# Patient Record
Sex: MALE | Race: BLACK OR AFRICAN AMERICAN | Employment: UNEMPLOYED | URBAN - METROPOLITAN AREA
[De-identification: names, ages, dates, MRNs, and addresses within clinical notes are randomized per-mention and may not be internally consistent; named-entity substitution may affect disease eponyms.]

---

## 2019-03-18 ENCOUNTER — APPOINTMENT (OUTPATIENT)
Dept: ULTRASOUND IMAGING | Age: 54
End: 2019-03-18
Payer: MEDICARE

## 2019-03-18 ENCOUNTER — APPOINTMENT (OUTPATIENT)
Dept: CT IMAGING | Age: 54
End: 2019-03-18
Payer: MEDICARE

## 2019-03-18 ENCOUNTER — HOSPITAL ENCOUNTER (EMERGENCY)
Age: 54
Discharge: HOME | End: 2019-03-18
Attending: EMERGENCY MEDICINE
Payer: MEDICARE

## 2019-03-18 VITALS
SYSTOLIC BLOOD PRESSURE: 215 MMHG | HEIGHT: 67 IN | WEIGHT: 200 LBS | OXYGEN SATURATION: 98 % | HEART RATE: 84 BPM | DIASTOLIC BLOOD PRESSURE: 142 MMHG | TEMPERATURE: 97.8 F | BODY MASS INDEX: 31.39 KG/M2 | RESPIRATION RATE: 18 BRPM

## 2019-03-18 DIAGNOSIS — H40.212 ACUTE ANGLE-CLOSURE GLAUCOMA OF LEFT EYE: Primary | ICD-10-CM

## 2019-03-18 DIAGNOSIS — I16.1 HYPERTENSIVE EMERGENCY: ICD-10-CM

## 2019-03-18 DIAGNOSIS — N17.9 AKI (ACUTE KIDNEY INJURY) (HCC): ICD-10-CM

## 2019-03-18 LAB
ALBUMIN SERPL-MCNC: 4.6 GM/DL (ref 3.4–5)
ALP BLD-CCNC: 108 IU/L (ref 40–129)
ALT SERPL-CCNC: 6 U/L (ref 10–40)
ANION GAP SERPL CALCULATED.3IONS-SCNC: 12 MMOL/L (ref 4–16)
AST SERPL-CCNC: 18 IU/L (ref 15–37)
BACTERIA: ABNORMAL /HPF
BASOPHILS ABSOLUTE: 0.1 K/CU MM
BASOPHILS RELATIVE PERCENT: 1 % (ref 0–1)
BILIRUB SERPL-MCNC: 1.2 MG/DL (ref 0–1)
BILIRUBIN URINE: NEGATIVE MG/DL
BLOOD, URINE: ABNORMAL
BUN BLDV-MCNC: 28 MG/DL (ref 6–23)
CALCIUM SERPL-MCNC: 9.3 MG/DL (ref 8.3–10.6)
CHLORIDE BLD-SCNC: 100 MMOL/L (ref 99–110)
CHLORIDE URINE RANDOM: 41 MMOL/L (ref 43–210)
CLARITY: CLEAR
CO2: 23 MMOL/L (ref 21–32)
COLOR: YELLOW
CREAT SERPL-MCNC: 3.3 MG/DL (ref 0.9–1.3)
CREATININE URINE: 222.4 MG/DL (ref 39–259)
DIFFERENTIAL TYPE: ABNORMAL
EOSINOPHILS ABSOLUTE: 0.1 K/CU MM
EOSINOPHILS RELATIVE PERCENT: 0.6 % (ref 0–3)
GFR AFRICAN AMERICAN: 24 ML/MIN/1.73M2
GFR NON-AFRICAN AMERICAN: 20 ML/MIN/1.73M2
GLUCOSE BLD-MCNC: 109 MG/DL (ref 70–99)
GLUCOSE, URINE: NEGATIVE MG/DL
HCT VFR BLD CALC: 51 % (ref 42–52)
HEMOGLOBIN: 16.8 GM/DL (ref 13.5–18)
HYALINE CASTS: 2 /LPF
IMMATURE NEUTROPHIL %: 0.3 % (ref 0–0.43)
KETONES, URINE: ABNORMAL MG/DL
LEUKOCYTE ESTERASE, URINE: NEGATIVE
LYMPHOCYTES ABSOLUTE: 2.3 K/CU MM
LYMPHOCYTES RELATIVE PERCENT: 22.9 % (ref 24–44)
MCH RBC QN AUTO: 31.2 PG (ref 27–31)
MCHC RBC AUTO-ENTMCNC: 32.9 % (ref 32–36)
MCV RBC AUTO: 94.8 FL (ref 78–100)
MONOCYTES ABSOLUTE: 0.8 K/CU MM
MONOCYTES RELATIVE PERCENT: 8 % (ref 0–4)
MUCUS: ABNORMAL HPF
NITRITE URINE, QUANTITATIVE: NEGATIVE
NUCLEATED RBC %: 0 %
PDW BLD-RTO: 12.5 % (ref 11.7–14.9)
PH, URINE: 5 (ref 5–8)
PLATELET # BLD: ABNORMAL K/CU MM (ref 140–440)
POTASSIUM SERPL-SCNC: 4.1 MMOL/L (ref 3.5–5.1)
POTASSIUM, UR: 33.4 MMOL/L (ref 22–119)
PROT/CREAT RATIO, UR: ABNORMAL
PROTEIN UA: >500 MG/DL
RBC # BLD: 5.38 M/CU MM (ref 4.6–6.2)
RBC URINE: 36 /HPF (ref 0–3)
SEGMENTED NEUTROPHILS ABSOLUTE COUNT: 6.7 K/CU MM
SEGMENTED NEUTROPHILS RELATIVE PERCENT: 67.2 % (ref 36–66)
SODIUM BLD-SCNC: 135 MMOL/L (ref 135–145)
SODIUM URINE: 45 MMOL/L (ref 35–167)
SPECIFIC GRAVITY UA: 1.02 (ref 1–1.03)
SQUAMOUS EPITHELIAL: 10 /HPF
TOTAL IMMATURE NEUTOROPHIL: 0.03 K/CU MM
TOTAL NUCLEATED RBC: 0 K/CU MM
TOTAL PROTEIN: 8.1 GM/DL (ref 6.4–8.2)
TRICHOMONAS: ABNORMAL /HPF
URINE TOTAL PROTEIN: 409.7 MG/DL
UROBILINOGEN, URINE: 2 MG/DL (ref 0.2–1)
WBC # BLD: 10 K/CU MM (ref 4–10.5)
WBC UA: 8 /HPF (ref 0–2)
YEAST: ABNORMAL /HPF

## 2019-03-18 PROCEDURE — 6370000000 HC RX 637 (ALT 250 FOR IP): Performed by: PHYSICIAN ASSISTANT

## 2019-03-18 PROCEDURE — 82436 ASSAY OF URINE CHLORIDE: CPT

## 2019-03-18 PROCEDURE — 96374 THER/PROPH/DIAG INJ IV PUSH: CPT

## 2019-03-18 PROCEDURE — 2500000003 HC RX 250 WO HCPCS: Performed by: PHYSICIAN ASSISTANT

## 2019-03-18 PROCEDURE — 99285 EMERGENCY DEPT VISIT HI MDM: CPT

## 2019-03-18 PROCEDURE — 80053 COMPREHEN METABOLIC PANEL: CPT

## 2019-03-18 PROCEDURE — 84133 ASSAY OF URINE POTASSIUM: CPT

## 2019-03-18 PROCEDURE — 70450 CT HEAD/BRAIN W/O DYE: CPT

## 2019-03-18 PROCEDURE — 76775 US EXAM ABDO BACK WALL LIM: CPT

## 2019-03-18 PROCEDURE — 81001 URINALYSIS AUTO W/SCOPE: CPT

## 2019-03-18 PROCEDURE — 36415 COLL VENOUS BLD VENIPUNCTURE: CPT

## 2019-03-18 PROCEDURE — 84300 ASSAY OF URINE SODIUM: CPT

## 2019-03-18 PROCEDURE — 93010 ELECTROCARDIOGRAM REPORT: CPT | Performed by: INTERNAL MEDICINE

## 2019-03-18 PROCEDURE — 93005 ELECTROCARDIOGRAM TRACING: CPT | Performed by: PHYSICIAN ASSISTANT

## 2019-03-18 PROCEDURE — 84156 ASSAY OF PROTEIN URINE: CPT

## 2019-03-18 PROCEDURE — 85025 COMPLETE CBC W/AUTO DIFF WBC: CPT

## 2019-03-18 PROCEDURE — 82570 ASSAY OF URINE CREATININE: CPT

## 2019-03-18 RX ORDER — LATANOPROST 50 UG/ML
1 SOLUTION/ DROPS OPHTHALMIC ONCE
Status: COMPLETED | OUTPATIENT
Start: 2019-03-18 | End: 2019-03-18

## 2019-03-18 RX ORDER — TETRACAINE HYDROCHLORIDE 5 MG/ML
1 SOLUTION OPHTHALMIC ONCE
Status: COMPLETED | OUTPATIENT
Start: 2019-03-18 | End: 2019-03-18

## 2019-03-18 RX ORDER — HYDROCHLOROTHIAZIDE 25 MG/1
25 TABLET ORAL DAILY
Qty: 30 TABLET | Refills: 0 | Status: ON HOLD | OUTPATIENT
Start: 2019-03-18 | End: 2019-03-27 | Stop reason: HOSPADM

## 2019-03-18 RX ORDER — ONDANSETRON 4 MG/1
4 TABLET, ORALLY DISINTEGRATING ORAL ONCE
Status: COMPLETED | OUTPATIENT
Start: 2019-03-18 | End: 2019-03-18

## 2019-03-18 RX ORDER — TIMOLOL MALEATE 5 MG/ML
1 SOLUTION/ DROPS OPHTHALMIC ONCE
Status: COMPLETED | OUTPATIENT
Start: 2019-03-18 | End: 2019-03-18

## 2019-03-18 RX ORDER — LABETALOL HYDROCHLORIDE 5 MG/ML
10 INJECTION, SOLUTION INTRAVENOUS ONCE
Status: COMPLETED | OUTPATIENT
Start: 2019-03-18 | End: 2019-03-18

## 2019-03-18 RX ORDER — HYDROCODONE BITARTRATE AND ACETAMINOPHEN 5; 325 MG/1; MG/1
1 TABLET ORAL ONCE
Status: COMPLETED | OUTPATIENT
Start: 2019-03-18 | End: 2019-03-18

## 2019-03-18 RX ADMIN — ONDANSETRON 4 MG: 4 TABLET, ORALLY DISINTEGRATING ORAL at 11:42

## 2019-03-18 RX ADMIN — LABETALOL HYDROCHLORIDE 10 MG: 5 INJECTION, SOLUTION INTRAVENOUS at 12:55

## 2019-03-18 RX ADMIN — FLUORESCEIN SODIUM 1 MG: 1 STRIP OPHTHALMIC at 12:56

## 2019-03-18 RX ADMIN — HYDROCODONE BITARTRATE AND ACETAMINOPHEN 1 TABLET: 5; 325 TABLET ORAL at 11:58

## 2019-03-18 RX ADMIN — TIMOLOL MALEATE 1 DROP: 5 SOLUTION OPHTHALMIC at 13:58

## 2019-03-18 RX ADMIN — TETRACAINE HYDROCHLORIDE 1 DROP: 25 LIQUID OPHTHALMIC at 12:56

## 2019-03-18 RX ADMIN — LATANOPROST 1 DROP: 50 SOLUTION OPHTHALMIC at 13:58

## 2019-03-18 SDOH — HEALTH STABILITY: MENTAL HEALTH: HOW OFTEN DO YOU HAVE A DRINK CONTAINING ALCOHOL?: NEVER

## 2019-03-18 ASSESSMENT — PAIN DESCRIPTION - PAIN TYPE: TYPE: ACUTE PAIN

## 2019-03-18 ASSESSMENT — PAIN DESCRIPTION - ORIENTATION: ORIENTATION: LEFT

## 2019-03-18 ASSESSMENT — PAIN SCALES - GENERAL
PAINLEVEL_OUTOF10: 10
PAINLEVEL_OUTOF10: 10

## 2019-03-18 ASSESSMENT — PAIN DESCRIPTION - LOCATION: LOCATION: EYE

## 2019-03-22 LAB
EKG ATRIAL RATE: 88 BPM
EKG DIAGNOSIS: NORMAL
EKG P AXIS: 61 DEGREES
EKG P-R INTERVAL: 158 MS
EKG Q-T INTERVAL: 396 MS
EKG QRS DURATION: 82 MS
EKG QTC CALCULATION (BAZETT): 479 MS
EKG R AXIS: 55 DEGREES
EKG T AXIS: 45 DEGREES
EKG VENTRICULAR RATE: 88 BPM

## 2019-03-23 ENCOUNTER — HOSPITAL ENCOUNTER (INPATIENT)
Age: 54
LOS: 4 days | Discharge: HOME | DRG: 194 | End: 2019-03-27
Attending: EMERGENCY MEDICINE | Admitting: HOSPITALIST
Payer: MEDICARE

## 2019-03-23 DIAGNOSIS — I10 MALIGNANT HYPERTENSION: Primary | ICD-10-CM

## 2019-03-23 PROBLEM — I16.1 HYPERTENSIVE EMERGENCY: Status: ACTIVE | Noted: 2019-03-23

## 2019-03-23 LAB
ALBUMIN SERPL-MCNC: 4.6 GM/DL (ref 3.4–5)
ALP BLD-CCNC: 106 IU/L (ref 40–129)
ALT SERPL-CCNC: 13 U/L (ref 10–40)
AMPHETAMINES: NEGATIVE
ANION GAP SERPL CALCULATED.3IONS-SCNC: 17 MMOL/L (ref 4–16)
AST SERPL-CCNC: 18 IU/L (ref 15–37)
BACTERIA: ABNORMAL /HPF
BARBITURATE SCREEN URINE: NEGATIVE
BASOPHILS ABSOLUTE: 0.1 K/CU MM
BASOPHILS RELATIVE PERCENT: 1 % (ref 0–1)
BENZODIAZEPINE SCREEN, URINE: NEGATIVE
BILIRUB SERPL-MCNC: 1.5 MG/DL (ref 0–1)
BILIRUBIN URINE: NEGATIVE MG/DL
BLOOD, URINE: ABNORMAL
BUN BLDV-MCNC: 45 MG/DL (ref 6–23)
CALCIUM SERPL-MCNC: 9.4 MG/DL (ref 8.3–10.6)
CANNABINOID SCREEN URINE: NEGATIVE
CHLORIDE BLD-SCNC: 91 MMOL/L (ref 99–110)
CHLORIDE URINE RANDOM: 29 MMOL/L (ref 43–210)
CLARITY: CLEAR
CO2: 22 MMOL/L (ref 21–32)
COCAINE METABOLITE: NEGATIVE
COLOR: YELLOW
COMMENT: ABNORMAL
CREAT SERPL-MCNC: 4.3 MG/DL (ref 0.9–1.3)
CREATININE URINE: 196.3 MG/DL
CREATININE URINE: 201.2 MG/DL (ref 39–259)
DIFFERENTIAL TYPE: ABNORMAL
DIFFERENTIAL TYPE: ABNORMAL
EOSINOPHILS ABSOLUTE: 0.1 K/CU MM
EOSINOPHILS RELATIVE PERCENT: 0.6 % (ref 0–3)
GFR AFRICAN AMERICAN: 18 ML/MIN/1.73M2
GFR NON-AFRICAN AMERICAN: 15 ML/MIN/1.73M2
GIANT PLATELETS: PRESENT
GLUCOSE BLD-MCNC: 108 MG/DL (ref 70–99)
GLUCOSE, URINE: NEGATIVE MG/DL
HCT VFR BLD CALC: 54.3 % (ref 42–52)
HEMOGLOBIN: 18.2 GM/DL (ref 13.5–18)
HYALINE CASTS: 2 /LPF
IMMATURE NEUTROPHIL %: 0.3 % (ref 0–0.43)
KETONES, URINE: NEGATIVE MG/DL
LACTATE DEHYDROGENASE: 337 IU/L (ref 120–246)
LEUKOCYTE ESTERASE, URINE: NEGATIVE
LYMPHOCYTES ABSOLUTE: 3.4 K/CU MM
LYMPHOCYTES RELATIVE PERCENT: 29.3 % (ref 24–44)
MCH RBC QN AUTO: 31.2 PG (ref 27–31)
MCHC RBC AUTO-ENTMCNC: 33.5 % (ref 32–36)
MCV RBC AUTO: 93.1 FL (ref 78–100)
MONOCYTES ABSOLUTE: 1 K/CU MM
MONOCYTES RELATIVE PERCENT: 8.8 % (ref 0–4)
MUCUS: ABNORMAL HPF
NITRITE URINE, QUANTITATIVE: NEGATIVE
NUCLEATED RBC %: 0 %
OPIATES, URINE: NEGATIVE
OSMOLALITY URINE: 411 MOS/L (ref 292–1090)
OXYCODONE: NORMAL
PDW BLD-RTO: 11.9 % (ref 11.7–14.9)
PH, URINE: 5 (ref 5–8)
PHENCYCLIDINE, URINE: NEGATIVE
PLATELET # BLD: ABNORMAL K/CU MM (ref 140–440)
POTASSIUM SERPL-SCNC: 3.2 MMOL/L (ref 3.5–5.1)
POTASSIUM, UR: 44.3 MMOL/L (ref 22–119)
PRO-BNP: 6059 PG/ML
PROT/CREAT RATIO, UR: ABNORMAL
PROTEIN UA: 100 MG/DL
RBC # BLD: 5.83 M/CU MM (ref 4.6–6.2)
RBC # BLD: ABNORMAL 10*6/UL
RBC URINE: 41 /HPF (ref 0–3)
RETICULOCYTE COUNT PCT: 1.6 % (ref 0.2–2.2)
SEGMENTED NEUTROPHILS ABSOLUTE COUNT: 7 K/CU MM
SEGMENTED NEUTROPHILS RELATIVE PERCENT: 60 % (ref 36–66)
SODIUM BLD-SCNC: 130 MMOL/L (ref 135–145)
SODIUM URINE: 18 MMOL/L (ref 35–167)
SPECIFIC GRAVITY UA: 1.01 (ref 1–1.03)
TOTAL IMMATURE NEUTOROPHIL: 0.03 K/CU MM
TOTAL NUCLEATED RBC: 0 K/CU MM
TOTAL PROTEIN: 8.3 GM/DL (ref 6.4–8.2)
TRICHOMONAS: ABNORMAL /HPF
TROPONIN T: 0.05 NG/ML
TROPONIN T: 0.12 NG/ML
URINE TOTAL PROTEIN: 272.8 MG/DL
UROBILINOGEN, URINE: 1 MG/DL (ref 0.2–1)
WBC # BLD: 11.6 K/CU MM (ref 4–10.5)
WBC UA: <1 /HPF (ref 0–2)

## 2019-03-23 PROCEDURE — 96365 THER/PROPH/DIAG IV INF INIT: CPT

## 2019-03-23 PROCEDURE — 84244 ASSAY OF RENIN: CPT

## 2019-03-23 PROCEDURE — 84156 ASSAY OF PROTEIN URINE: CPT

## 2019-03-23 PROCEDURE — 85025 COMPLETE CBC W/AUTO DIFF WBC: CPT

## 2019-03-23 PROCEDURE — 82436 ASSAY OF URINE CHLORIDE: CPT

## 2019-03-23 PROCEDURE — 85045 AUTOMATED RETICULOCYTE COUNT: CPT

## 2019-03-23 PROCEDURE — 99253 IP/OBS CNSLTJ NEW/EST LOW 45: CPT | Performed by: INTERNAL MEDICINE

## 2019-03-23 PROCEDURE — 2580000003 HC RX 258: Performed by: EMERGENCY MEDICINE

## 2019-03-23 PROCEDURE — 99285 EMERGENCY DEPT VISIT HI MDM: CPT

## 2019-03-23 PROCEDURE — 84133 ASSAY OF URINE POTASSIUM: CPT

## 2019-03-23 PROCEDURE — 83010 ASSAY OF HAPTOGLOBIN QUANT: CPT

## 2019-03-23 PROCEDURE — 83935 ASSAY OF URINE OSMOLALITY: CPT

## 2019-03-23 PROCEDURE — 83835 ASSAY OF METANEPHRINES: CPT

## 2019-03-23 PROCEDURE — 6360000002 HC RX W HCPCS: Performed by: INTERNAL MEDICINE

## 2019-03-23 PROCEDURE — 83880 ASSAY OF NATRIURETIC PEPTIDE: CPT

## 2019-03-23 PROCEDURE — 2580000003 HC RX 258: Performed by: NURSE PRACTITIONER

## 2019-03-23 PROCEDURE — 2060000000 HC ICU INTERMEDIATE R&B

## 2019-03-23 PROCEDURE — 83615 LACTATE (LD) (LDH) ENZYME: CPT

## 2019-03-23 PROCEDURE — 80053 COMPREHEN METABOLIC PANEL: CPT

## 2019-03-23 PROCEDURE — 80307 DRUG TEST PRSMV CHEM ANLYZR: CPT

## 2019-03-23 PROCEDURE — 2500000003 HC RX 250 WO HCPCS: Performed by: EMERGENCY MEDICINE

## 2019-03-23 PROCEDURE — 6370000000 HC RX 637 (ALT 250 FOR IP): Performed by: INTERNAL MEDICINE

## 2019-03-23 PROCEDURE — 6370000000 HC RX 637 (ALT 250 FOR IP): Performed by: NURSE PRACTITIONER

## 2019-03-23 PROCEDURE — 82088 ASSAY OF ALDOSTERONE: CPT

## 2019-03-23 PROCEDURE — 84484 ASSAY OF TROPONIN QUANT: CPT

## 2019-03-23 PROCEDURE — 36415 COLL VENOUS BLD VENIPUNCTURE: CPT

## 2019-03-23 PROCEDURE — 81001 URINALYSIS AUTO W/SCOPE: CPT

## 2019-03-23 PROCEDURE — 84300 ASSAY OF URINE SODIUM: CPT

## 2019-03-23 PROCEDURE — 82570 ASSAY OF URINE CREATININE: CPT

## 2019-03-23 RX ORDER — POTASSIUM CHLORIDE 7.45 MG/ML
10 INJECTION INTRAVENOUS PRN
Status: DISCONTINUED | OUTPATIENT
Start: 2019-03-23 | End: 2019-03-27 | Stop reason: HOSPADM

## 2019-03-23 RX ORDER — SODIUM CHLORIDE 0.9 % (FLUSH) 0.9 %
10 SYRINGE (ML) INJECTION PRN
Status: DISCONTINUED | OUTPATIENT
Start: 2019-03-23 | End: 2019-03-27 | Stop reason: HOSPADM

## 2019-03-23 RX ORDER — HEPARIN SODIUM 5000 [USP'U]/ML
5000 INJECTION, SOLUTION INTRAVENOUS; SUBCUTANEOUS EVERY 8 HOURS SCHEDULED
Status: DISCONTINUED | OUTPATIENT
Start: 2019-03-23 | End: 2019-03-23

## 2019-03-23 RX ORDER — SODIUM CHLORIDE 9 MG/ML
INJECTION, SOLUTION INTRAVENOUS CONTINUOUS
Status: DISCONTINUED | OUTPATIENT
Start: 2019-03-23 | End: 2019-03-23

## 2019-03-23 RX ORDER — HYDROCHLOROTHIAZIDE 12.5 MG/1
25 TABLET ORAL DAILY
Status: DISCONTINUED | OUTPATIENT
Start: 2019-03-23 | End: 2019-03-25

## 2019-03-23 RX ORDER — SODIUM CHLORIDE 0.9 % (FLUSH) 0.9 %
10 SYRINGE (ML) INJECTION EVERY 12 HOURS SCHEDULED
Status: DISCONTINUED | OUTPATIENT
Start: 2019-03-23 | End: 2019-03-27 | Stop reason: HOSPADM

## 2019-03-23 RX ORDER — ACETAMINOPHEN 325 MG/1
650 TABLET ORAL EVERY 4 HOURS PRN
Status: DISCONTINUED | OUTPATIENT
Start: 2019-03-23 | End: 2019-03-27 | Stop reason: HOSPADM

## 2019-03-23 RX ORDER — HEPARIN SODIUM 5000 [USP'U]/ML
5000 INJECTION, SOLUTION INTRAVENOUS; SUBCUTANEOUS 2 TIMES DAILY
Status: DISCONTINUED | OUTPATIENT
Start: 2019-03-23 | End: 2019-03-27 | Stop reason: HOSPADM

## 2019-03-23 RX ORDER — POTASSIUM CHLORIDE 20 MEQ/1
40 TABLET, EXTENDED RELEASE ORAL PRN
Status: DISCONTINUED | OUTPATIENT
Start: 2019-03-23 | End: 2019-03-27 | Stop reason: HOSPADM

## 2019-03-23 RX ORDER — ONDANSETRON 2 MG/ML
4 INJECTION INTRAMUSCULAR; INTRAVENOUS EVERY 6 HOURS PRN
Status: DISCONTINUED | OUTPATIENT
Start: 2019-03-23 | End: 2019-03-27 | Stop reason: HOSPADM

## 2019-03-23 RX ORDER — MAGNESIUM SULFATE 1 G/100ML
1 INJECTION INTRAVENOUS PRN
Status: DISCONTINUED | OUTPATIENT
Start: 2019-03-23 | End: 2019-03-27 | Stop reason: HOSPADM

## 2019-03-23 RX ORDER — METOPROLOL TARTRATE 50 MG/1
50 TABLET, FILM COATED ORAL 2 TIMES DAILY
Status: DISCONTINUED | OUTPATIENT
Start: 2019-03-23 | End: 2019-03-25

## 2019-03-23 RX ORDER — POTASSIUM CHLORIDE 1.5 G/1.77G
40 POWDER, FOR SOLUTION ORAL PRN
Status: DISCONTINUED | OUTPATIENT
Start: 2019-03-23 | End: 2019-03-27 | Stop reason: HOSPADM

## 2019-03-23 RX ORDER — DOCUSATE SODIUM 100 MG/1
100 CAPSULE, LIQUID FILLED ORAL 2 TIMES DAILY
Status: DISCONTINUED | OUTPATIENT
Start: 2019-03-23 | End: 2019-03-27 | Stop reason: HOSPADM

## 2019-03-23 RX ADMIN — ACETAMINOPHEN 650 MG: 325 TABLET ORAL at 17:18

## 2019-03-23 RX ADMIN — HYDROCHLOROTHIAZIDE 25 MG: 12.5 TABLET ORAL at 17:18

## 2019-03-23 RX ADMIN — DOCUSATE SODIUM 100 MG: 100 CAPSULE, LIQUID FILLED ORAL at 21:20

## 2019-03-23 RX ADMIN — SODIUM CHLORIDE, PRESERVATIVE FREE 10 ML: 5 INJECTION INTRAVENOUS at 21:23

## 2019-03-23 RX ADMIN — DEXTROSE MONOHYDRATE 3 MG/HR: 50 INJECTION, SOLUTION INTRAVENOUS at 13:41

## 2019-03-23 RX ADMIN — METOPROLOL TARTRATE 50 MG: 50 TABLET ORAL at 21:20

## 2019-03-23 RX ADMIN — HEPARIN SODIUM 5000 UNITS: 5000 INJECTION, SOLUTION INTRAVENOUS; SUBCUTANEOUS at 21:20

## 2019-03-23 ASSESSMENT — PAIN SCALES - GENERAL
PAINLEVEL_OUTOF10: 0
PAINLEVEL_OUTOF10: 3
PAINLEVEL_OUTOF10: 2
PAINLEVEL_OUTOF10: 10
PAINLEVEL_OUTOF10: 10

## 2019-03-23 ASSESSMENT — PAIN DESCRIPTION - PAIN TYPE
TYPE: OTHER (COMMENT)
TYPE: ACUTE PAIN
TYPE: ACUTE PAIN

## 2019-03-23 ASSESSMENT — PAIN DESCRIPTION - DESCRIPTORS
DESCRIPTORS: ACHING;HEADACHE
DESCRIPTORS: ACHING;HEADACHE

## 2019-03-23 ASSESSMENT — PAIN DESCRIPTION - LOCATION
LOCATION: HEAD

## 2019-03-23 NOTE — CONSULTS
Inpatient consult to Nephrology  Consult performed by: Kimberli Mtz MD  Consult ordered by: Natty Mckay MD  Assessment/Recommendations: Pt seen ,examined,interviewed and chart reviewed. Please see the dictated consult for details     Imp :   1,. HTn urgency with his hb > 16 g/dl [facility] but still possible as his PLT about 140 K - his K is low suggestive of high renin/ moni- need w/u for sec HTN including serology for SS / renal artery stenosis etc   2. KOFFI vs CKD- hsi EKG suggest hypertensive changes .  He also admits long standing HTN not on any tx - which would make sense     Plan:  Agree with plain ua and urine chemistry  - I have added LDH/ / hapto / with existing lab - just in    kimberly try see the peripheral smear for schoctocyteds  Goal is to lower BP 25 % within next 6 h and than slowly lowrrt  replet K and add moni and renin and metanephrin  Add SS szerology in am just in case   After moni drawn may add MRA  I am hesitant to try tropicamide - drop as she may have acute angle glaucoma will get in touch with the ophthalmologist  and he may need  Another good exam       Thanks for the consult    #[id number]

## 2019-03-23 NOTE — ED PROVIDER NOTES
file    Years of education: Not on file    Highest education level: Not on file   Occupational History    Not on file   Social Needs    Financial resource strain: Not on file    Food insecurity:     Worry: Not on file     Inability: Not on file    Transportation needs:     Medical: Not on file     Non-medical: Not on file   Tobacco Use    Smoking status: Current Every Day Smoker     Packs/day: 0.25     Types: Cigarettes    Smokeless tobacco: Never Used   Substance and Sexual Activity    Alcohol use: Never     Frequency: Never    Drug use: Never    Sexual activity: Not on file   Lifestyle    Physical activity:     Days per week: Not on file     Minutes per session: Not on file    Stress: Not on file   Relationships    Social connections:     Talks on phone: Not on file     Gets together: Not on file     Attends [employer] service: Not on file     Active member of club or organization: Not on file     Attends meetings of clubs or organizations: Not on file     Relationship status: Not on file    Intimate partner violence:     Fear of current or ex partner: Not on file     Emotionally abused: Not on file     Physically abused: Not on file     Forced sexual activity: Not on file   Other Topics Concern    Not on file   Social History Narrative    Not on file     Current Facility-Administered Medications   Medication Dose Route Frequency Provider Last Rate Last Dose    niCARdipine (CARDENE) 50 mg in dextrose 5 % 250 mL infusion  3 mg/hr Intravenous Continuous Armando Reyna MD         Current Outpatient Medications   Medication Sig Dispense Refill    hydrochlorothiazide (HYDRODIURIL) 25 MG tablet Take 1 tablet by mouth daily 30 tablet 0     Allergies   Allergen Reactions    Pcn [Penicillins]      diarrhea       Nursing Notes Reviewed    Physical Exam:  ED Triage Vitals [03/23/19 1209]   Enc Vitals Group      BP (!) 227/151      Pulse 109      Resp 18      Temp 98.9 °F (37.2 °C)      Temp Source Oral SpO2 97 %      Weight 200 lb (90.7 kg)      Height 5' 7\" (1.702 m)      Head Circumference       Peak Flow       Pain Score       Pain Loc       Pain Edu? Excl. in [address]? My pulse ox interpretation is - normal    General appearance:  No acute distress. Skin:  Warm. Dry. Eye:  Extraocular movements intact. Conjunctival injection noted in the left eye     Ears, nose, mouth and throat:  Oral mucosa moist   Neck:  Trachea midline. Extremity:  No swelling. Normal ROM     Heart:  Regular  Perfusion:  intact  Respiratory:   Respirations nonlabored. Abdominal:  Normal bowel sounds. Soft. Nontender. Non distended. Neurological:  Alert and oriented times 3.  Moves all extremities equally, cranial nerves grossly intact    I have reviewed and interpreted all of the currently available lab results from this visit (if applicable):  Results for orders placed or performed during the hospital encounter of 03/23/19   Comprehensive Metabolic Panel   Result Value Ref Range    Sodium 130 (L) 135 - 145 MMOL/L    Potassium 3.2 (L) 3.5 - 5.1 MMOL/L    Chloride 91 (L) 99 - 110 mMol/L    CO2 22 21 - 32 MMOL/L    BUN 45 (H) 6 - 23 MG/DL    CREATININE 4.3 (H) 0.9 - 1.3 MG/DL    Glucose 108 (H) 70 - 99 MG/DL    Calcium 9.4 8.3 - 10.6 MG/DL    Alb 4.6 3.4 - 5.0 GM/DL    Total Protein 8.3 (H) 6.4 - 8.2 GM/DL    Total Bilirubin 1.5 (H) 0.0 - 1.0 MG/DL    ALT 13 10 - 40 U/L    AST 18 15 - 37 IU/L    Alkaline Phosphatase 106 40 - 129 IU/L    GFR Non-African American 15 (L) >60 mL/min/1.73m2    GFR  18 (L) >60 mL/min/1.73m2    Anion Gap 17 (H) 4 - 16   CBC Auto Differential   Result Value Ref Range    WBC 11.6 (H) 4.0 - 10.5 K/CU MM    RBC 5.83 4.6 - 6.2 M/CU MM    Hemoglobin 18.2 (H) 13.5 - 18.0 GM/DL    Hematocrit 54.3 (H) 42 - 52 %    MCV 93.1 78 - 100 FL    MCH 31.2 (H) 27 - 31 PG    MCHC 33.5 32.0 - 36.0 %    RDW 11.9 11.7 - 14.9 %    Differential Type AUTOMATED DIFFERENTIAL     Segs Relative 60.0 36 - 66 %    Lymphocytes % 29.3 24 - 44 %    Monocytes % 8.8 (H) 0 - 4 %    Eosinophils % 0.6 0 - 3 %    Basophils % 1.0 0 - 1 %    Segs Absolute 7.0 K/CU MM    Lymphocytes # 3.4 K/CU MM    Monocytes # 1.0 K/CU MM    Eosinophils # 0.1 K/CU MM    Basophils # 0.1 K/CU MM    Nucleated RBC % 0.0 %    Total Nucleated RBC 0.0 K/CU MM    Total Immature Neutrophil 0.03 K/CU MM    Immature Neutrophil % 0.3 0 - 0.43 %      Radiographs (if obtained):  [] The following radiograph was interpreted by myself in the absence of a radiologist:   [] Radiologist's Report Reviewed:  No orders to display         EKG (if obtained): (All EKG's are interpreted by myself in the absence of a cardiologist)    Chart review shows recent radiographs:  Ct Head Wo Contrast    Result Date: 3/18/2019  EXAMINATION: CT OF THE HEAD WITHOUT CONTRAST  3/18/2019 1:27 pm TECHNIQUE: CT of the head was performed without the administration of intravenous contrast. Dose modulation, iterative reconstruction, and/or weight based adjustment of the mA/kV was utilized to reduce the radiation dose to as low as reasonably achievable. COMPARISON: None. HISTORY: ORDERING SYSTEM PROVIDED HISTORY: headache TECHNOLOGIST PROVIDED HISTORY: Has a \"code stroke\" or \"stroke alert\" been called? ->No Ordering Physician Provided Reason for Exam: headache; HTN X2 WEEKS Acuity: Acute Type of Exam: Initial Relevant Medical/Surgical History: NONE FINDINGS: BRAIN/VENTRICLES: There is no acute intracranial hemorrhage, mass effect or midline shift. No abnormal extra-axial fluid collection. The gray-white differentiation is maintained without evidence of an acute infarct. There is no evidence of hydrocephalus. There are nonspecific hypoattenuating foci in the subcortical and periventricular white matter that most likely represent chronic microangiopathic ischemic changes in a patient of this age. ORBITS: The visualized portion of the orbits demonstrate no acute abnormality.  SINUSES: The visualized paranasal sinuses and mastoid air cells demonstrate no acute abnormality. SOFT TISSUES/SKULL:  No acute abnormality of the visualized skull or soft tissues. No acute intracranial abnormality. Prominent subcortical and periventricular hypoattenuating white matter foci. No acute large vascular territorial infarct. Us Retroperitoneal Limited    Result Date: 3/18/2019  EXAMINATION: RETROPERITONEAL ULTRASOUND OF THE KIDNEYS 3/18/2019 COMPARISON: None HISTORY: ORDERING SYSTEM PROVIDED HISTORY: KOFFI TECHNOLOGIST PROVIDED HISTORY: Acuity: Acute Type of Exam: Initial Relevant Medical/Surgical History: uncontrolled htn FINDINGS: Kidneys: The right kidney measures 10.9 cm in length and the left kidney measures 10 cm in length. Renal cortex measures 17-18 mm in thickness. Kidneys demonstrate normal cortical echogenicity. No evidence of hydronephrosis or intrarenal stones. No hydronephrosis. No abnormality in the kidneys. MDM:  Patient here with high blood pressure, headache. Headache not the worse of his life no evidence of subarachnoid hemorrhage or meningitis, he had some labs sent his creatinine is 4.3 which is up from 3.3 last week, given significantly elevated blood pressure, worsening creatinine he is going to be treated for malignant hypertension I spoke with nephrology, spoke with hospitalist we started a nicardipine drip aiming for a systolic blood pressure around 180, patient will be admitted to the hospital for further workup and treatment      Critical care time performed on the patient exclusive of separately billable procedures includingInitial patient evaluation, repeat evaluation, case discussion, chart review, charting and discussion with patient and family where/when is 3 minutes    Clinical Impression:  1. Malignant hypertension      Disposition referral (if applicable):  No follow-up provider specified.   Disposition medications (if applicable):  New Prescriptions    No medications on file       Comment: Please note this report has been produced using speech recognition software and may contain errors related to that system including errors in grammar, punctuation, and spelling, as well as words and phrases that may be inappropriate. If there are any questions or concerns please feel free to contact the dictating provider for clarification.         Christos Smith MD  03/23/19 7159

## 2019-03-23 NOTE — H&P
History and Physical      Name:  Padmini Vasquez /Age/Sex: 1965  (47 y.o. male)   MRN & CSN:  [id number] & [id number] Admission Date/Time: 3/23/2019 12:16 PM   Location:  ED[facility] PCP: No primary care provider on file. Hospital Day: 1        Admitting Physician: Dr. Gallo Toure. Tess      Assessment and Plan:   Padmini Vasquez is a 47 y.o. male who presents with  Hypertension (seen here Monday started on med states no better); Headache; and Shortness of Breath     Hypertensive emergency. presenting BPs 220s/150s     Admit to PCU   IV cardene infusion   Cardiology consulted for evaluation     Acute kidney injury- sCr 4.3 with baseline unknown. Monitor lab trends    Pending urine indices    Holding nephrotoxic agents   Nephrology consulted for evaluation      Hypokalemia-3.2- replacement ordered    Diet Renal   DVT Prophylaxis [] Lovenox, [x]  Heparin, [] SCDs, [] Ambulation  [] Long term AC   GI Prophylaxis [] PPI,  [x] H2 Blocker,  [] Carafate,  [] Diet/Tube Feeds   Code Status Full     Disposition Admit to inpatient. Patient plans to return home upon discharge   MDM [] Low, [] Moderate,[x]  High     -Patient assessment and plan discussed and reviewed with admitting physician: Chester Villarreal MD.     History of Present Illness:     Chief Complaint: Hypertension (seen here Monday started on med states no better); Headache; and Shortness of Breath    Padmini Vasquez is a 47 y.o. male who presents with high blood pressure. Evaluated on 3/18 for complaints of eye pain/vision changes/ HA x 2 weeks. Noted elevated creatine/severe hypertension trend while in emergency room. He was recommended for admission d/t concern for glaucoma/ htn emergency at that time but left AMA. He did follow up with Opthalmologist Dr Queta Melvin, states \"he couldn't did do anything\". Presents today d/t continued symptoms. Associated HA and reports SOB yesterday.      Ten point ROS: reviewed negative, unless as noted in above HPI.    Objective:   No intake or output data in the 24 hours ending 03/23/19 1321     Vitals:   Vitals:    03/23/19 1209   BP: (!) 227/151   Pulse: 109   Resp: 18   Temp: 98.9 °F (37.2 °C)   TempSrc: Oral   SpO2: 97%   Weight: 200 lb (90.7 kg)   Height: 5' 7\" (1.702 m)       Physical Exam: 03/23/19     GEN -Awake nontoxic appearing male, sitting upright in bed , NAD. normal body habitus. Appears given age. EYES -Pupils are equally round. OS scleral erythema,  HENT -MM are moist. Oral pharynx without exudates, no evidence of thrush. NECK -Supple, no apparent thyromegaly or masses. RESP -CTA, no wheezes, rales or rhonchi. Symmetric chest movement while on RA.   C/V -S1/S2 auscultated. RRR without appreciable M/R/G. No JVD or carotid bruits. Peripheral pulses equal bilaterally and palpable. Cap refill <3 sec. No peripheral edema. GI -Abdomen is soft non distended and without significant TTP. + BS. No masses or guarding. Rectal exam deferred.  -No CVA/ flank tenderness. Workman catheter is not present. LYMPH-No palpable cervical lymphadenopathy and no hepatosplenomegaly. No petechiae or ecchymoses. MS -No gross joint deformities. SKIN -Normal coloration, warm, dry. NEURO-Cranial nerves appear grossly intact, normal speech, no lateralizing weakness. PSYC-Awake, alert, oriented x 4- person, place, time, situation,  Appropriate affect. Past Medical History:      Past Medical History:   Diagnosis Date    Back ache     Hypertension        Past Surgical  History:    has no past surgical history on file.     Social History:    FAM HX: Assessed non contributory    Soc HX:   Social History     Socioeconomic History    Marital status: Single     Spouse name: None    Number of children: None    Years of education: None    Highest education level: None   Occupational History    None   Social Needs    Financial resource strain: None    Food insecurity:     Worry: None     Inability: None    Transportation demonstrate normal cortical echogenicity. No evidence of hydronephrosis or intrarenal stones. No hydronephrosis. No abnormality in the kidneys.          EKG this visit:  Reviewed         Electronically signed by NITIN Antonio CNP on 3/23/2019 at 1:21 PM

## 2019-03-23 NOTE — PROGRESS NOTES
Full consult dictated # [id number]  Hypertensive crisis. Titrate Cardene drip to SBP around 160. Add Metoprolol & HCTZ.   Check Echo

## 2019-03-23 NOTE — PROGRESS NOTES
Case d/w Dr Avis Miranda ( ED)  Very high BP  May have malignant HTn  nicardipine drip started by me   Goal is to lower BP 2 5% for the next 6 h than slowly 140/80 for the next 1-2 days   W/u for malignant HTN  and sec HTn  He had eye exam last Monday by a local ophthalmologist   Will see if we can get some info  He claims  he never seen any neither nephrologist nor \" BP ' doctor   Full consult  To come

## 2019-03-24 LAB
ALBUMIN SERPL-MCNC: 4.7 GM/DL (ref 3.4–5)
ALBUMIN SERPL-MCNC: 4.7 GM/DL (ref 3.4–5)
ALP BLD-CCNC: 101 IU/L (ref 40–128)
ALP BLD-CCNC: 101 IU/L (ref 40–129)
ALT SERPL-CCNC: 21 U/L (ref 10–40)
ALT SERPL-CCNC: 21 U/L (ref 10–40)
ANION GAP SERPL CALCULATED.3IONS-SCNC: 16 MMOL/L (ref 4–16)
AST SERPL-CCNC: 23 IU/L (ref 15–37)
AST SERPL-CCNC: 23 IU/L (ref 15–37)
BASOPHILS ABSOLUTE: 0.1 K/CU MM
BASOPHILS RELATIVE PERCENT: 0.7 % (ref 0–1)
BILIRUB SERPL-MCNC: 1.3 MG/DL (ref 0–1)
BILIRUB SERPL-MCNC: 1.3 MG/DL (ref 0–1)
BILIRUBIN DIRECT: 0.4 MG/DL (ref 0–0.3)
BILIRUBIN, INDIRECT: 0.9 MG/DL (ref 0–0.7)
BUN BLDV-MCNC: 49 MG/DL (ref 6–23)
CALCIUM SERPL-MCNC: 9.8 MG/DL (ref 8.3–10.6)
CHLORIDE BLD-SCNC: 86 MMOL/L (ref 99–110)
CO2: 28 MMOL/L (ref 21–32)
CREAT SERPL-MCNC: 4.4 MG/DL (ref 0.9–1.3)
DIFFERENTIAL TYPE: ABNORMAL
EOSINOPHILS ABSOLUTE: 0.1 K/CU MM
EOSINOPHILS RELATIVE PERCENT: 0.6 % (ref 0–3)
GFR AFRICAN AMERICAN: 17 ML/MIN/1.73M2
GFR NON-AFRICAN AMERICAN: 14 ML/MIN/1.73M2
GLUCOSE BLD-MCNC: 106 MG/DL (ref 70–99)
HCT VFR BLD CALC: 51.3 % (ref 42–52)
HEMOGLOBIN: 17.4 GM/DL (ref 13.5–18)
IMMATURE NEUTROPHIL %: 0.3 % (ref 0–0.43)
LYMPHOCYTES ABSOLUTE: 3.3 K/CU MM
LYMPHOCYTES RELATIVE PERCENT: 25.7 % (ref 24–44)
MAGNESIUM: 2.7 MG/DL (ref 1.8–2.4)
MCH RBC QN AUTO: 31.4 PG (ref 27–31)
MCHC RBC AUTO-ENTMCNC: 33.9 % (ref 32–36)
MCV RBC AUTO: 92.4 FL (ref 78–100)
MONOCYTES ABSOLUTE: 1 K/CU MM
MONOCYTES RELATIVE PERCENT: 7.8 % (ref 0–4)
NUCLEATED RBC %: 0.2 %
PDW BLD-RTO: 11.8 % (ref 11.7–14.9)
PLATELET # BLD: 108 K/CU MM (ref 140–440)
POTASSIUM SERPL-SCNC: 3.2 MMOL/L (ref 3.5–5.1)
RBC # BLD: 5.55 M/CU MM (ref 4.6–6.2)
SEGMENTED NEUTROPHILS ABSOLUTE COUNT: 8.4 K/CU MM
SEGMENTED NEUTROPHILS RELATIVE PERCENT: 64.9 % (ref 36–66)
SODIUM BLD-SCNC: 130 MMOL/L (ref 135–145)
TOTAL IMMATURE NEUTOROPHIL: 0.04 K/CU MM
TOTAL NUCLEATED RBC: 0 K/CU MM
TOTAL PROTEIN: 7.3 GM/DL (ref 6.4–8.2)
TOTAL PROTEIN: 7.3 GM/DL (ref 6.4–8.2)
TROPONIN T: 0.09 NG/ML
WBC # BLD: 12.9 K/CU MM (ref 4–10.5)

## 2019-03-24 PROCEDURE — 85025 COMPLETE CBC W/AUTO DIFF WBC: CPT

## 2019-03-24 PROCEDURE — 36415 COLL VENOUS BLD VENIPUNCTURE: CPT

## 2019-03-24 PROCEDURE — 84484 ASSAY OF TROPONIN QUANT: CPT

## 2019-03-24 PROCEDURE — 99232 SBSQ HOSP IP/OBS MODERATE 35: CPT | Performed by: INTERNAL MEDICINE

## 2019-03-24 PROCEDURE — 2500000003 HC RX 250 WO HCPCS: Performed by: EMERGENCY MEDICINE

## 2019-03-24 PROCEDURE — 82248 BILIRUBIN DIRECT: CPT

## 2019-03-24 PROCEDURE — 6370000000 HC RX 637 (ALT 250 FOR IP): Performed by: INTERNAL MEDICINE

## 2019-03-24 PROCEDURE — 85397 CLOTTING FUNCT ACTIVITY: CPT

## 2019-03-24 PROCEDURE — 2580000003 HC RX 258: Performed by: EMERGENCY MEDICINE

## 2019-03-24 PROCEDURE — 6370000000 HC RX 637 (ALT 250 FOR IP): Performed by: NURSE PRACTITIONER

## 2019-03-24 PROCEDURE — 2060000000 HC ICU INTERMEDIATE R&B

## 2019-03-24 PROCEDURE — 6360000002 HC RX W HCPCS: Performed by: INTERNAL MEDICINE

## 2019-03-24 PROCEDURE — 83735 ASSAY OF MAGNESIUM: CPT

## 2019-03-24 PROCEDURE — 2580000003 HC RX 258: Performed by: NURSE PRACTITIONER

## 2019-03-24 PROCEDURE — 80053 COMPREHEN METABOLIC PANEL: CPT

## 2019-03-24 RX ORDER — LISINOPRIL 20 MG/1
20 TABLET ORAL 2 TIMES DAILY
Status: DISCONTINUED | OUTPATIENT
Start: 2019-03-24 | End: 2019-03-25

## 2019-03-24 RX ORDER — SPIRONOLACTONE 25 MG/1
25 TABLET ORAL 2 TIMES DAILY
Status: DISCONTINUED | OUTPATIENT
Start: 2019-03-24 | End: 2019-03-25

## 2019-03-24 RX ORDER — AMLODIPINE BESYLATE 5 MG/1
5 TABLET ORAL 2 TIMES DAILY
Status: DISCONTINUED | OUTPATIENT
Start: 2019-03-24 | End: 2019-03-25

## 2019-03-24 RX ADMIN — POTASSIUM CHLORIDE 40 MEQ: 20 TABLET, EXTENDED RELEASE ORAL at 05:30

## 2019-03-24 RX ADMIN — ACETAMINOPHEN 650 MG: 325 TABLET ORAL at 21:32

## 2019-03-24 RX ADMIN — DEXTROSE MONOHYDRATE 3 MG/HR: 50 INJECTION, SOLUTION INTRAVENOUS at 04:54

## 2019-03-24 RX ADMIN — AMLODIPINE BESYLATE 5 MG: 5 TABLET ORAL at 21:31

## 2019-03-24 RX ADMIN — DOCUSATE SODIUM 100 MG: 100 CAPSULE, LIQUID FILLED ORAL at 08:09

## 2019-03-24 RX ADMIN — HEPARIN SODIUM 5000 UNITS: 5000 INJECTION, SOLUTION INTRAVENOUS; SUBCUTANEOUS at 08:10

## 2019-03-24 RX ADMIN — HEPARIN SODIUM 5000 UNITS: 5000 INJECTION, SOLUTION INTRAVENOUS; SUBCUTANEOUS at 21:32

## 2019-03-24 RX ADMIN — SPIRONOLACTONE 25 MG: 25 TABLET ORAL at 08:10

## 2019-03-24 RX ADMIN — LISINOPRIL 20 MG: 20 TABLET ORAL at 21:32

## 2019-03-24 RX ADMIN — SODIUM CHLORIDE, PRESERVATIVE FREE 10 ML: 5 INJECTION INTRAVENOUS at 08:10

## 2019-03-24 RX ADMIN — METOPROLOL TARTRATE 50 MG: 50 TABLET ORAL at 08:09

## 2019-03-24 RX ADMIN — ACETAMINOPHEN 650 MG: 325 TABLET ORAL at 08:08

## 2019-03-24 RX ADMIN — SODIUM CHLORIDE, PRESERVATIVE FREE 10 ML: 5 INJECTION INTRAVENOUS at 21:32

## 2019-03-24 RX ADMIN — AMLODIPINE BESYLATE 5 MG: 5 TABLET ORAL at 08:10

## 2019-03-24 RX ADMIN — HYDROCHLOROTHIAZIDE 25 MG: 12.5 TABLET ORAL at 08:08

## 2019-03-24 RX ADMIN — DOCUSATE SODIUM 100 MG: 100 CAPSULE, LIQUID FILLED ORAL at 21:31

## 2019-03-24 RX ADMIN — LISINOPRIL 20 MG: 20 TABLET ORAL at 08:10

## 2019-03-24 RX ADMIN — ACETAMINOPHEN 650 MG: 325 TABLET ORAL at 02:16

## 2019-03-24 RX ADMIN — METOPROLOL TARTRATE 50 MG: 50 TABLET ORAL at 21:32

## 2019-03-24 ASSESSMENT — PAIN DESCRIPTION - PROGRESSION
CLINICAL_PROGRESSION: NOT CHANGED

## 2019-03-24 ASSESSMENT — PAIN DESCRIPTION - FREQUENCY
FREQUENCY: CONTINUOUS
FREQUENCY: CONTINUOUS
FREQUENCY: INTERMITTENT

## 2019-03-24 ASSESSMENT — PAIN SCALES - GENERAL
PAINLEVEL_OUTOF10: 6
PAINLEVEL_OUTOF10: 0
PAINLEVEL_OUTOF10: 6
PAINLEVEL_OUTOF10: 6
PAINLEVEL_OUTOF10: 2
PAINLEVEL_OUTOF10: 0

## 2019-03-24 ASSESSMENT — PAIN DESCRIPTION - LOCATION
LOCATION: HEAD

## 2019-03-24 ASSESSMENT — PAIN DESCRIPTION - ONSET
ONSET: ON-GOING
ONSET: GRADUAL
ONSET: ON-GOING

## 2019-03-24 ASSESSMENT — PAIN DESCRIPTION - ORIENTATION
ORIENTATION: MID
ORIENTATION: ANTERIOR
ORIENTATION: RIGHT;LEFT

## 2019-03-24 ASSESSMENT — PAIN DESCRIPTION - PAIN TYPE
TYPE: ACUTE PAIN

## 2019-03-24 ASSESSMENT — PAIN DESCRIPTION - DESCRIPTORS
DESCRIPTORS: ACHING

## 2019-03-24 ASSESSMENT — PAIN - FUNCTIONAL ASSESSMENT: PAIN_FUNCTIONAL_ASSESSMENT: PREVENTS OR INTERFERES SOME ACTIVE ACTIVITIES AND ADLS

## 2019-03-24 NOTE — PROGRESS NOTES
Nephrology Progress Note  3/24/2019 7:18 AM        Subjective:   Admit Date: 3/23/2019  PCP: No primary care provider on file. Interval History: C/O H/a     Diet: some    ROS:  With nicardipine drip- /90 this  am   No sob/ no cp    Data:     Current med's:    sodium chloride flush  10 mL Intravenous 2 times per day    docusate sodium  100 mg Oral BID    metoprolol tartrate  50 mg Oral BID    hydrochlorothiazide  25 mg Oral Daily    heparin (porcine)  5,000 Units Subcutaneous BID      niCARdipine 2.8 mg/hr (03/24/19 0705)         I/O last 3 completed shifts: In: 18 [P.O.:180; I.V.:390]  Out: -     CBC:   Recent Labs     03/23/19  1216 03/24/19  0344   WBC 11.6* 12.9*   HGB 18.2* 17.4     RESULTS CONFIRMED BY SMEAR REVIEW   108*          Recent Labs     03/23/19  1216 03/24/19  0344   * 130*   K 3.2* 3.2*   CL 91* 86*   CO2 22 28   BUN 45* 49*   CREATININE 4.3* 4.4*   GLUCOSE 108* 106*       Lab Results   Component Value Date    CALCIUM 9.8 03/24/2019       Objective:     Vitals: BP (!) 159/101   Pulse 97   Temp 98.2 °F (36.8 °C)   Resp 29   Ht 5' 7\" (1.702 m)   Wt 172 lb 2.9 oz (78.1 kg)   SpO2 98%   BMI 26.97 kg/m²     General appearance:  No distress/   HE ENT:  Lt eye vision loss  Neck:  supple  Lungs:  CTA  Heart:  Seems RRR  Abdomen: soft, no abd bruits  Extremities:  No edema       Problem List :         Impression :     1. KOFFI vs worsening of CKD- peripheral smear has no schistocytes- LDH barely elevated, retic count is not high - but has low PLT- taken together MAHA less likely but renal TMA is a possibility mainly from malignant HTN- I do not suspect  TTP / HUS but low plt is little concerning to me - with better BP- cr expected to go up- . Also he has striking low K -  Suggesting very high renin/ moni state   2.  HTN - he likely has it for long time as evidenced by EKG and I suspect in echo he will have sever LVH     Recommendation/Plan  :     1. I would  Start po ccb/ acei / aldactone - if any suspicion for MARCELL will hold acei   2. Wean the pressor off  3. Sec w/U pending but add HOSEA TS 13 just incase   4. I will try review the smear again with hematologist   5. If PLT drops more ( as heparin may have dropped it ) may have to revisit HUS/TTP  6. At this point I am not very suspicious   7. He will need a good eye exam   8. Bmp in am   9.  Add serology for scleroderma just in case       Barbara Moses MD

## 2019-03-24 NOTE — PROGRESS NOTES
Hospitalist Progress Note      Name:  Padmini Vasquez /Age/Sex: 1965  (47 y.o. male)   MRN & CSN:  [id number] & [id number] Admission Date/Time: 3/23/2019 12:16 PM   Location:  -A PCP: No primary care provider on file. Hospital Day: 2    Assessment and Plan:   Padmini Vasquez is a 47 y.o. male who presents with  Hypertension (seen here Monday started on med states no better); Headache; and Shortness of Breath     Hypertensive emergency. presenting BPs 220s/150s   -  IV cardene started, now transitioned to oral medication  -  Started on po ccb/ acei/ aldactone as per nephrology  -  Doubt malignant hypertension at this time, however pending work up  -  BP decreased this am to 125/93, patient complaining of lightheadedness  -  Will try to keep BP in 223Z systolic  -  Echo pending, stress when better    Acute kidney injury- sCr 4.3 with baseline unknown. -  Cr 4.4     Hypokalemia-3.2- replacement ordered    DVT PPx: lovenox    History of Present Illness:     Chief Complaint: Hypertension (seen here Monday started on med states no better); Headache; and Shortness of Breath    Patient seen this am.  Continues to complain of headache. States vision is improving    Ten point ROS reviewed negative, unless as noted above    Objective: Intake/Output Summary (Last 24 hours) at 3/24/2019 1406  Last data filed at 3/24/2019 0525  Gross per 24 hour   Intake 570 ml   Output --   Net 570 ml      Vitals:   Vitals:    19 1114   BP: 103/79   Pulse: 69   Resp: 20   Temp: 98 °F (36.7 °C)   SpO2: 98%     Physical Exam:   General: A&Ox4, no acute distress  HENT: atraumatic, normal cephalic  Eyes: PERRLA, EOMI  CVS: S1, S2 WNL, RRR,  No murmurs rubs or gallops  Lungs: CTA BL, symmetrical chest expansion  Abdomen: soft, non-tender.   BSx4Q  Neuro: CN2-12 grossly intact, no focal deficits  Psych: appropriate mood and affect     Medications:   Medications:    amLODIPine  5 mg Oral BID    spironolactone  25 mg Oral BID    lisinopril  20 mg Oral BID    sodium chloride flush  10 mL Intravenous 2 times per day    docusate sodium  100 mg Oral BID    metoprolol tartrate  50 mg Oral BID    hydrochlorothiazide  25 mg Oral Daily    heparin (porcine)  5,000 Units Subcutaneous BID      Infusions:   PRN Meds:   sodium chloride flush 10 mL PRN   ondansetron 4 mg Q6H PRN   acetaminophen 650 mg Q4H PRN   potassium chloride 40 mEq PRN   Or     potassium alternative oral replacement 40 mEq PRN   Or     potassium chloride 10 mEq PRN   magnesium sulfate 1 g PRN         Electronically signed by Asmita Ji MD on 3/24/2019 at 2:06 PM

## 2019-03-24 NOTE — PROGRESS NOTES
Dr. Theron Gonzalez of pt's refusal to take Aldactone. Pt states he is not taking any meds until he feels better. Pt c/o lightheadedness d/t drop in b/p. B/p stable but lower than what pt's stated \"normal\" is.

## 2019-03-24 NOTE — CONSULTS
[address], [address] [facility]                                  CONSULTATION    PATIENT NAME: Priscila Read                       :        1965  MED REC NO:   [id number]                          ROOM:         ACCOUNT NO:   [de-identified]                           ADMIT DATE: 2019  PROVIDER:     Shaun Rader MD    CONSULT DATE:  2019    REFERRING PHYSICIAN:  Raphael Segovia MD    REASON FOR CONSULTATION:  Hypertensive urgency. HISTORY OF PRESENT ILLNESS:  The patient is a 61-year-old  -American gentleman with apparently known history of  hypertension, but quite noncompliant with treatments. Reportedly had  lost eyesight of his left eye also, was seen here in the hospital a few  days ago and was started on hydrochlorothiazide. The patient says it  did not do anything for his blood pressure. He continued to have severe  headaches, hence he came back. The patient was noted to be with really  high blood pressure. His diastolic was ranging up to 150 mmHg couple of  times and systolic about around 209 mmHg. He was started on Cardene  drip and hospitalized. His blood pressure is slowly getting better with  titration of the drip now. PAST MEDICAL HISTORY:  Significant for known history of hypertension and  backache problems. He denies having had any cardiac issues in the past.  No history of dyslipidemia or diabetes, but he does not go to the  doctors on a regular basis either. PAST SURGICAL HISTORY:  Nil significant. FAMILY HISTORY:  No history of premature coronary artery disease in the  family. SOCIAL HISTORY:  He is single. He smokes about a quarter-pack of  cigarettes per day. There is no alcohol abuse according to his own  declaration. REVIEW OF SYSTEMS:  Mainly significant for loss of vision in the left  eye and severe headaches, but no symptoms suggestive of any TIA or  stroke. Other systems are without any abnormalities also. ALLERGIES:  He is allergic to PENICILLIN. MEDICATIONS:  He is currently being treated with Cardene drip. He is  ordered to have magnesium 1 gm on a p.r.n. basis. He is on heparin  prophylaxis. PHYSICAL EXAMINATION:  GENERAL:  A 60-year-old -American gentleman, not in acute  distress. VITAL SIGNS:  He is afebrile, heart rate about 89 beats per minute and  regular, blood pressure is 200/124 mmHg. HEENT:  Reveals head to be atraumatic, normocephalic. Extraocular  movements intact. His left eye appears to be red. Eye examination  otherwise does not reveal any jaundice and conjunctivae are pink. SKIN:  Appears to be healthy. I did not see any rashes. NECK:  Supple. No elevation of JVD. Carotid upstrokes are intact. I  did not hear bruit. HEART:  Reveals regular rate and rhythm. There is a 3/6 systolic murmur  heard. LUNGS:  Clear to auscultation. ABDOMEN:  Soft and nontender. Bowel sounds are present. There is no  organomegaly. RECTAL:  Deferred. GENITAL:  Deferred. EXTREMITIES:  Do not reveal cyanosis, clubbing, or edema. Pedal pulses  are palpable. NEUROLOGIC:  He is alert and oriented x3. Grossly, the examination is  nonfocal.    The patient's EKG reveals normal sinus rhythm at 88 beats per minute,  biatrial enlargement and nonspecific T-wave abnormality. QTc is  prolonged at 479. LABORATORY DATA:  Reveals serum sodium to be a little low at 130,  potassium is 3.2, BUN and creatinine are normal at 45/4.3. They were  28/3.3 on 03/18/2019. His proBNP is elevated to 6059. Troponin T level  is 0.04. White count is slightly elevated at 11.6 and hemoglobin 18.2  gm. ASSESSMENT AND PLAN:  This 60-year-old -American gentleman who  appears to be very noncompliant is more interested in obtaining  disability than maintaining his health. The patient's blood pressure is  severely elevated.   We will try to bring it down

## 2019-03-24 NOTE — PROGRESS NOTES
Steve razo dc'd per Dr. Taryn Stauffer. Cardene weaned to 1.8mg/hr with bp 166/100. Po medications also given at this time.

## 2019-03-24 NOTE — PROGRESS NOTES
Pt sitting on side of bed with c/o headache. Dr. Luis E Karimi in to see pt, echo in am. Discussed POC with pt who agrees and states understanding. Pt given PO Tylenol for c/o headache as dark room and rest has not improved pain. Will continue to monitor.

## 2019-03-24 NOTE — CONSULTS
one week. He has been doing it for a long time. Denied any history of alcohol or  illicit drug abuse. SOCIAL HISTORY:  The patient is originally from [place]. He was   once, has 7 children. 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 Somehow, he came here  about a year ago and he claimed that he was \"stranded here. \"  He lives  in an apartment, but does not have any job and unsure how he manages  everything economically. PAST SURGICAL HISTORY:  Apparently none. FAMILY MEDICAL HISTORY:  Apparently, nobody has high blood pressure in  his family nor anybody has advanced kidney disease. HOME MEDICATIONS:  Obviously, he was not taking anything. PHYSICAL EXAMINATION:  VITAL SIGNS:  Temperature afebrile, blood pressure done manually by me  on the right brachial artery while supine is about 180/100; he is on  nicardipine drip of course; his heart rate is 80; respiratory rate 16;  and satting 100%. GENERAL:  The patient is without any acute distress unless, although he  is not happy with the left eye vision. HEAD AND NECK:  Normocephalic, atraumatic. EYES:  Of course his left eye is propped out. EAR, MOUTH AND THROAT:  Unable to examine. CARDIOVASCULAR:  Seems regular rate and rhythm. RESPIRATORY:  No crackles. ABDOMEN:  I did not appreciate any abdominal bruit. EXTREMITIES:  No edema. LABORATORY VALUES AND ANCILLARY SERVICES:  His white count is 11.6,  hemoglobin 18.2, his platelet count is 595,319. Sodium is 130,  potassium 3.2, BUN and creatinine 45 and 4.3 respectively. Albumin is  4.6. His total bili is slightly elevated at about 1.5. IMPRESSION:  A 59-year-old male with hypertensive urgency and acute  kidney injury versus worsening of CKD. 1.  Hypertensive urgency. With hemoglobin more than 18, although the  platelet count is 279, unlikely he has macroangiopathic hemolytic anemia  i.e., malignant hypertension, but is possible.   His low potassium  suggests high renin and Jorge Luis, which he deserves secondary hypertension  workup including serology for systemic sclerosis as scleroderma crisis  can present in a similar way, although he has blood pressure for a long  time. 2.  KOFFI versus CKD. His EKG suggests hypertensive changes, but he  admits that he has longstanding hypertension and was not really taking  any medication. 3.  Left eye vision. Could have been from hypertension, which is unless  he has concomitant glaucoma. He claims that he was told his \"pressure  is high in the left eye. \"    PLAN:  1.  I agree with plain urine and urine chemistry. 2.  I added LDH. We will add haptoglobin with existing labs just in  case. 3.  Also we will draw metanephrine, Jorge Luis and renin. 4.  I did talk to the hematologist to look at the peripheral smear for  any schistocyte. 5.  Goal is to lower blood pressure by 25% within the next 6 hours and  then slowly lower it. 6.  We will treat potassium for the time being. Once the renin and Jorge Luis  is drawn, I will go ahead and add spironolactone. 7.  Add serology for systemic sclerosis and also other workup for  secondary hypertension. 8.  By the way, I will try to find out who the ophthalmologist is and  try to see what the finding is. I am a little reluctant to give him  _____ just in case if he had any acute glaucoma. 9.  Further plan will be dictated by his clinical course.         Donya Leonard MD    D: 03/23/2019 17:09:19       T: 03/23/2019 21:22:06     [email or web address]  Job#: [id number]     Doc#: [id number]    CC:  <>

## 2019-03-24 NOTE — PROGRESS NOTES
distress. EYES: No jaundice, no conjunctival pallor. SKIN: It is warm & dry. No rashes. No Echhymosis    HEENT - No clinically significant abnormalities seen. Neck - Supple. No jugular venous distention noted. No carotid bruits. Cardiovascular - Normal S1 and S2 without obvious murmur or gallop. Extremities - No cyanosis, clubbing, or significant edema. Pulmonary - No respiratory distress. Clear to auscultation. No wheezes or rales. Abdomen - Non tender,No masses, tenderness, or organomegaly. Musculoskeletal - No significant edema. No joint deformities. No muscle wasting. Neurologic - Cranial nerves II through XII are grossly intact. There were no gross focal neurologic abnormalities. Allergies  Pcn [penicillins]    Medications:    amLODIPine  5 mg Oral BID    spironolactone  25 mg Oral BID    lisinopril  20 mg Oral BID    sodium chloride flush  10 mL Intravenous 2 times per day    docusate sodium  100 mg Oral BID    metoprolol tartrate  50 mg Oral BID    hydrochlorothiazide  25 mg Oral Daily    heparin (porcine)  5,000 Units Subcutaneous BID       sodium chloride flush, ondansetron, acetaminophen, potassium chloride **OR** potassium alternative oral replacement **OR** potassium chloride, magnesium sulfate    Lab Data:  CBC:   Recent Labs     03/23/19  1216 03/24/19  0344   WBC 11.6* 12.9*   HGB 18.2* 17.4   HCT 54.3* 51.3   MCV 93.1 92.4     RESULTS CONFIRMED BY SMEAR REVIEW   108*     BMP:   Recent Labs     03/23/19  1216 03/24/19  0344   * 130*   K 3.2* 3.2*   CL 91* 86*   CO2 22 28   BUN 45* 49*   CREATININE 4.3* 4.4*     LIVER PROFILE:   Recent Labs     03/23/19  1216 03/24/19  0344   AST 18 23  23   ALT 13 21  21   BILIDIR  --  0.4*   BILITOT 1.5* 1.3*  1.3*   ALKPHOS 106 101  101     PT/INR: No results for input(s): PROTIME, INR in the last 72 hours. APTT: No results for input(s): APTT in the last 72 hours.   PRO BNP:  Invalid input(s): PRO BNP  TROPONIN:

## 2019-03-25 LAB
ANION GAP SERPL CALCULATED.3IONS-SCNC: 15 MMOL/L (ref 4–16)
BASOPHILS ABSOLUTE: 0.1 K/CU MM
BASOPHILS RELATIVE PERCENT: 0.6 % (ref 0–1)
BUN BLDV-MCNC: 63 MG/DL (ref 6–23)
CALCIUM SERPL-MCNC: 9.2 MG/DL (ref 8.3–10.6)
CHLORIDE BLD-SCNC: 87 MMOL/L (ref 99–110)
CO2: 24 MMOL/L (ref 21–32)
CREAT SERPL-MCNC: 6.6 MG/DL (ref 0.9–1.3)
DIFFERENTIAL TYPE: ABNORMAL
EOSINOPHILS ABSOLUTE: 0.1 K/CU MM
EOSINOPHILS RELATIVE PERCENT: 0.5 % (ref 0–3)
GFR AFRICAN AMERICAN: 11 ML/MIN/1.73M2
GFR NON-AFRICAN AMERICAN: 9 ML/MIN/1.73M2
GLUCOSE BLD-MCNC: 94 MG/DL (ref 70–99)
HCT VFR BLD CALC: 52.4 % (ref 42–52)
HEMOGLOBIN: 17.3 GM/DL (ref 13.5–18)
IMMATURE NEUTROPHIL %: 0.5 % (ref 0–0.43)
LV EF: 43 %
LVEF MODALITY: NORMAL
LYMPHOCYTES ABSOLUTE: 3.7 K/CU MM
LYMPHOCYTES RELATIVE PERCENT: 33.9 % (ref 24–44)
MCH RBC QN AUTO: 30.7 PG (ref 27–31)
MCHC RBC AUTO-ENTMCNC: 33 % (ref 32–36)
MCV RBC AUTO: 92.9 FL (ref 78–100)
MONOCYTES ABSOLUTE: 0.9 K/CU MM
MONOCYTES RELATIVE PERCENT: 8.5 % (ref 0–4)
NUCLEATED RBC %: 0 %
PDW BLD-RTO: 11.9 % (ref 11.7–14.9)
PHOSPHORUS: 6.3 MG/DL (ref 2.5–4.9)
PLATELET # BLD: 103 K/CU MM (ref 140–440)
POTASSIUM SERPL-SCNC: 3.8 MMOL/L (ref 3.5–5.1)
RBC # BLD: 5.64 M/CU MM (ref 4.6–6.2)
SEGMENTED NEUTROPHILS ABSOLUTE COUNT: 6.2 K/CU MM
SEGMENTED NEUTROPHILS RELATIVE PERCENT: 56 % (ref 36–66)
SODIUM BLD-SCNC: 126 MMOL/L (ref 135–145)
TOTAL IMMATURE NEUTOROPHIL: 0.05 K/CU MM
TOTAL NUCLEATED RBC: 0 K/CU MM
TOTAL RETICULOCYTE COUNT: 0.09 K/CU MM
WBC # BLD: 11 K/CU MM (ref 4–10.5)

## 2019-03-25 PROCEDURE — 2060000000 HC ICU INTERMEDIATE R&B

## 2019-03-25 PROCEDURE — 2580000003 HC RX 258: Performed by: INTERNAL MEDICINE

## 2019-03-25 PROCEDURE — 93306 TTE W/DOPPLER COMPLETE: CPT

## 2019-03-25 PROCEDURE — APPSS30 APP SPLIT SHARED TIME 16-30 MINUTES: Performed by: NURSE PRACTITIONER

## 2019-03-25 PROCEDURE — 6370000000 HC RX 637 (ALT 250 FOR IP): Performed by: INTERNAL MEDICINE

## 2019-03-25 PROCEDURE — 86235 NUCLEAR ANTIGEN ANTIBODY: CPT

## 2019-03-25 PROCEDURE — 85025 COMPLETE CBC W/AUTO DIFF WBC: CPT

## 2019-03-25 PROCEDURE — 84100 ASSAY OF PHOSPHORUS: CPT

## 2019-03-25 PROCEDURE — 6370000000 HC RX 637 (ALT 250 FOR IP): Performed by: NURSE PRACTITIONER

## 2019-03-25 PROCEDURE — 99232 SBSQ HOSP IP/OBS MODERATE 35: CPT | Performed by: INTERNAL MEDICINE

## 2019-03-25 PROCEDURE — 94761 N-INVAS EAR/PLS OXIMETRY MLT: CPT

## 2019-03-25 PROCEDURE — 80048 BASIC METABOLIC PNL TOTAL CA: CPT

## 2019-03-25 PROCEDURE — 2580000003 HC RX 258: Performed by: NURSE PRACTITIONER

## 2019-03-25 PROCEDURE — 6360000002 HC RX W HCPCS: Performed by: INTERNAL MEDICINE

## 2019-03-25 PROCEDURE — 36415 COLL VENOUS BLD VENIPUNCTURE: CPT

## 2019-03-25 RX ORDER — SPIRONOLACTONE 25 MG/1
25 TABLET ORAL DAILY
Status: DISCONTINUED | OUTPATIENT
Start: 2019-03-26 | End: 2019-03-25

## 2019-03-25 RX ORDER — 0.9 % SODIUM CHLORIDE 0.9 %
250 INTRAVENOUS SOLUTION INTRAVENOUS ONCE
Status: COMPLETED | OUTPATIENT
Start: 2019-03-25 | End: 2019-03-25

## 2019-03-25 RX ADMIN — ACETAMINOPHEN 650 MG: 325 TABLET ORAL at 09:51

## 2019-03-25 RX ADMIN — LISINOPRIL 20 MG: 20 TABLET ORAL at 09:41

## 2019-03-25 RX ADMIN — METOPROLOL TARTRATE 50 MG: 50 TABLET ORAL at 09:41

## 2019-03-25 RX ADMIN — SODIUM CHLORIDE, PRESERVATIVE FREE 10 ML: 5 INJECTION INTRAVENOUS at 21:23

## 2019-03-25 RX ADMIN — HEPARIN SODIUM 5000 UNITS: 5000 INJECTION, SOLUTION INTRAVENOUS; SUBCUTANEOUS at 10:58

## 2019-03-25 RX ADMIN — HYDROCHLOROTHIAZIDE 25 MG: 12.5 TABLET ORAL at 09:40

## 2019-03-25 RX ADMIN — AMLODIPINE BESYLATE 5 MG: 5 TABLET ORAL at 09:41

## 2019-03-25 RX ADMIN — HEPARIN SODIUM 5000 UNITS: 5000 INJECTION, SOLUTION INTRAVENOUS; SUBCUTANEOUS at 21:24

## 2019-03-25 RX ADMIN — SODIUM CHLORIDE 250 ML: 9 INJECTION, SOLUTION INTRAVENOUS at 16:58

## 2019-03-25 RX ADMIN — SPIRONOLACTONE 25 MG: 25 TABLET ORAL at 09:40

## 2019-03-25 RX ADMIN — SODIUM CHLORIDE, PRESERVATIVE FREE 10 ML: 5 INJECTION INTRAVENOUS at 09:41

## 2019-03-25 ASSESSMENT — PAIN SCALES - GENERAL
PAINLEVEL_OUTOF10: 3
PAINLEVEL_OUTOF10: 3
PAINLEVEL_OUTOF10: 0
PAINLEVEL_OUTOF10: 4
PAINLEVEL_OUTOF10: 5
PAINLEVEL_OUTOF10: 3
PAINLEVEL_OUTOF10: 6
PAINLEVEL_OUTOF10: 3
PAINLEVEL_OUTOF10: 3
PAINLEVEL_OUTOF10: 2
PAINLEVEL_OUTOF10: 4
PAINLEVEL_OUTOF10: 4

## 2019-03-25 NOTE — CARE COORDINATION
Spoke with patient at bedside. Patient is from home alone and is independent. Patient has [employer] health insurance and is able to afford his prescriptions. Patient does not have a PCP. CM provided PCP list for patient and patient to f/u with choosing provider. Patient plans to return home . No other needs identified at this time.

## 2019-03-25 NOTE — PROGRESS NOTES
Nephrology Progress Note  3/25/2019 10:41 AM        Subjective:   Admit Date: 3/23/2019  PCP: No primary care provider on file. Interval History: had LH likely from low BP     Diet: some    ROS:  No LH this am , Lt vision loss    Data:     Current med's:    amLODIPine  5 mg Oral BID    spironolactone  25 mg Oral BID    sodium chloride flush  10 mL Intravenous 2 times per day    docusate sodium  100 mg Oral BID    metoprolol tartrate  50 mg Oral BID    heparin (porcine)  5,000 Units Subcutaneous BID           I/O last 3 completed shifts: In: 360 [P.O.:360]  Out: -     CBC:   Recent Labs     03/23/19  1216 03/24/19  0344 03/25/19  0832   WBC 11.6* 12.9* 11.0*   HGB 18.2* 17.4 17.3     RESULTS CONFIRMED BY SMEAR REVIEW   108* 103*          Recent Labs     03/23/19  1216 03/24/19  0344 03/25/19  0832   * 130* 126*   K 3.2* 3.2* 3.8   CL 91* 86* 87*   CO2 22 28 24   BUN 45* 49* 63*   CREATININE 4.3* 4.4* 6.6*   GLUCOSE 108* 106* 94       Lab Results   Component Value Date    CALCIUM 9.2 03/25/2019    PHOS 6.3 (H) 03/25/2019       Objective:     Vitals: /75   Pulse 79   Temp 98.2 °F (36.8 °C) (Oral)   Resp 15   Ht 5' 7\" (1.702 m)   Wt 172 lb 2.9 oz (78.1 kg)   SpO2 100%   BMI 26.97 kg/m²     General appearance:  Did not like LH while ambulating- I can understand  HEENT:  No pallor,   Neck:  supple  Lungs:  CTA  Heart:  Seems RRR  Abdomen: soft, no abd bruits  Extremities:  No overt edema   Neurologic:  No focal deficit       Problem List :         Impression :     1. KOFFI/ ? worsening of CKD - unexpectedly he responded better than I thought with acei/CCB- also on MRA and bb/ thiazide - increase cr could be from several reason- a) lower BP, B) / renal artery stenosis and with acei either FMD or atherosclerotic - less likely HUS/TTP- PLT stable but did drop from 140 to > 100 K - he is off course with heparin   2. HTN- as above  3.  Low na - potential etiology- HCTX/ high dose MRA    Recommendation/Plan  :     1. Keep BP . 120/70  2. Hold acei/ thiazide now  3. Lower aldactone to 25/d  4. MRA of renal artery  5. Pt dose not want to see ophthalmologist  6. I have spoken to Dr Nadya Posadas- just in case to see the pt for any suspicion for HUS including aHUS/TTP - ADMTS 13 pending   7. Manual BP  8. Labs in am   9.  Follow clinically       Jorge Das MD

## 2019-03-25 NOTE — PROGRESS NOTES
Pt resting quietly in bed. Pt sitting up to eat dinner at this time. Normal saline 250mL bolus running due to low BP. Pt doing well at this time. No further questions.

## 2019-03-25 NOTE — CONSULTS
[facility]  Consult note  3/25/2019  6:31 PM    Patient:    Kalina Lea  : 1965   47 y.o. MRN: [id number]  Admitted: 3/23/2019 12:16 PM ATT: Berenice Lyons MD   [id number]  AdmitDx: Malignant hypertension [I10]  Hypertensive emergency [I16.1]  PCP: No primary care provider on file. Reason for Consult: thrombocytopenia    Requesting Physician:  Berenice Lyons MD      History Obtained From:  Patient and review of all records    HISTORY OF PRESENT ILLNESS:    Very pleasant 46 YO AAM reported blurred vision in the left eye and also ha for the past 2 weeks. Reported intermittent chest pain, increased sob. Reported dizziness but no palpitations. Reported nausea ane emesis. Denied any abdominal pain, diarrhea or any constipation. Denied any overt bleeding or any rash. Reported that he has a h/o hypertension but not been compliant with antihypertensives. He is originally from [place], came here to support his friend last year but got stuck here    Upon arrival /151    3/18/19: Renal U/S:No hydronephrosis.  No abnormality in the kidneys. 3/18/19: CT head:  No acute intracranial abnormality.       Prominent subcortical and periventricular hypoattenuating white matter foci. No acute large vascular territorial infarct. 3/25/19: ECHO: Summary   Left ventricular systolic function is mildly depressed.   Ejection fraction is visually estimated at 40 to 45 %.   Severe concentric left ventricular hypertrophy.   Inferior and inferoseptal walls appear hypokinetic.   Grade I diastolic dysfunction.   Essentially normal chamber sizes.   No significant valvular disease noted.   No evidence of any pericardial effusion.     3/18/19: Cr 3.3  3/24/19: Na 130, K 3.2, bun 49, creatinine 4.4, TBili 1.3  3/25/19: Cr 6.6    3/18/19 Hb 16.8, Hct 51, platelets 973   wbc 12.9, Hb 17, hct 51, platelet 554H    Past Medical History:        Diagnosis Date    Back ache     Hypertension        Past Surgical History: History reviewed. No pertinent surgical history. Current Medications:    Medications    Scheduled Medications:    sodium chloride  250 mL Intravenous Once    sodium chloride flush  10 mL Intravenous 2 times per day    docusate sodium  100 mg Oral BID    heparin (porcine)  5,000 Units Subcutaneous BID     PRN Medications: sodium chloride flush, ondansetron, acetaminophen, potassium chloride **OR** potassium alternative oral replacement **OR** potassium chloride, magnesium sulfate      Allergies:  Pcn [penicillins]    Social History:   TOBACCO:   reports that he has been smoking cigarettes. He has been smoking about 0.25 packs per day. He has never used smokeless tobacco.  ETOH:   reports that he does not drink alcohol. Family History:   History reviewed. No pertinent family history. REVIEW OF SYSTEMS:    As per HPI    PHYSICAL EXAM:      Vitals:    /75   Pulse 73   Temp 98.3 °F (36.8 °C) (Oral)   Resp 18   Ht 5' 7\" (1.702 m)   Wt 172 lb 2.9 oz (78.1 kg)   SpO2 100%   BMI 26.97 kg/m²   Alert and awake, blind in left eye  ctab  s1s2 tachy  Soft ntnd bs pos  No le edema GI  No rash    DATA:    ABGs: No results found for: PHART, PO2ART, DBK6NUM  CBC:   Recent Labs     03/23/19  1216 03/24/19  0344 03/25/19  0832   WBC 11.6* 12.9* 11.0*   HGB 18.2* 17.4 17.3     RESULTS CONFIRMED BY SMEAR REVIEW   108* 103*     BMP:    Recent Labs     03/23/19  1216 03/24/19  0344 03/25/19  0832   * 130* 126*   K 3.2* 3.2* 3.8   CL 91* 86* 87*   CO2 22 28 24   BUN 45* 49* 63*   CREATININE 4.3* 4.4* 6.6*   GLUCOSE 108* 106* 94     Magnesium:   Lab Results   Component Value Date    MG 2.7 03/24/2019     Hepatic:   Recent Labs     03/23/19  1216 03/24/19  0344   AST 18 23  23   ALT 13 21  21   BILITOT 1.5* 1.3*  1.3*   ALKPHOS 106 101  101     No results for input(s): LIPASE, AMYLASE in the last 72 hours. No results for input(s): PROTIME, INR in the last 72 hours.   No results for input(s): PTT in the last 72 hours. Lipids: No results for input(s): CHOL, HDL in the last 72 hours. Invalid input(s): LDLCALCU  INR: No results for input(s): INR in the last 72 hours. TSH: No results found for: TSH    Intake/Output Summary (Last 24 hours) at 3/25/2019 1831  Last data filed at 3/25/2019 1330  Gross per 24 hour   Intake 370 ml   Output --   Net 370 ml       PS with few schistocytes, 2-4 per HPF. Few stomatocytes. Acanthocytes. No spherocytes. Few giant platelets. No platelet clumping. Thrombocytopenia: hemolytic parameters and labs not suggestive of maha. ADAMTS 13 pending. Will obtain SPEP/EMILIO and also Pt/aptt. Consider r/o HIPA if continues to drop    WILLIAN: Is being followed by Nephrology. ?may need renal biopsy. Check complement levels    HTN    Continue other medical care    Thank you for letting us be part of the care and will follow along    Discussed the above findings but not sure if he comprehends.     Clifton Rubio  3/25/2019  6:31 PM

## 2019-03-25 NOTE — PLAN OF CARE
Problem: Discharge Planning:  Goal: Participates in care planning  Description  Participates in care planning  Outcome: Ongoing  Goal: Discharged to appropriate level of care  Description  Discharged to appropriate level of care  Outcome: Ongoing     Problem: Anxiety/Stress:  Goal: Level of anxiety will decrease  Description  Level of anxiety will decrease  Outcome: Ongoing     Problem: Mental Status - Impaired:  Goal: Mental status will be restored to baseline  Description  Mental status will be restored to baseline  Outcome: Ongoing     Problem: Pain:  Goal: Pain level will decrease  Description  Pain level will decrease  Outcome: Ongoing  Goal: Recognizes and communicates pain  Description  Recognizes and communicates pain  Outcome: Ongoing  Goal: Control of acute pain  Description  Control of acute pain  Outcome: Ongoing  Goal: Control of chronic pain  Description  Control of chronic pain  Outcome: Ongoing     Problem: Skin Integrity - Impaired:  Goal: Will show no infection signs and symptoms  Description  Will show no infection signs and symptoms  Outcome: Ongoing  Goal: Absence of new skin breakdown  Description  Absence of new skin breakdown  Outcome: Ongoing     Problem: Falls - Risk of:  Goal: Will remain free from falls  Description  Will remain free from falls  Outcome: Ongoing  Goal: Absence of physical injury  Description  Absence of physical injury  Outcome: Ongoing

## 2019-03-25 NOTE — PROGRESS NOTES
[place] [facility] [place]  Fady Meyer  Phone: [phone]    Fax [phone]                  Jaison Ragland MD, Ti Inman MD, [address], MD, MD Anna Julien MD Mela Fairy, MD Julianne Orn, NITIN Mendoza, NITIN Barnes, NITIN    Cardiology Progress Note     Today's Plan: continue medications    Admit Date:  3/23/2019    Consult reason/ Seen today for: Hypertensive Emergency and abnormal troponin  Subjective and  Overnight Events:  Edwin feels dizzy and has significant photosensitivity. He had refused to take some medications this morning. Discussed the need to continue to take his medications. Advised him that he could cause himself more organ dysfunction and damage by letting his blood pressure remain high. He was agreeable to continuing current medications. Will continue to reinforce the importance of medication therapy. Assessment / Plan / Recommendation:       1. Elevated Troponin: most likely type 2. Trending down. Will recheck Cardiolite when patient agreeable. Echo is pending from this morning. 2. HTN: stable, continue norvasc, meteprolol, and aldactone      History of Presenting Illness:  Chief complain on admission : 47 y. o.year old who is admitted for  Chief Complaint   Patient presents with    Hypertension     seen here Monday started on meds Monday states no better    Headache    Shortness of Breath        Past medical history:    has a past medical history of Back ache and Hypertension. Past surgical history:   has no past surgical history on file. Social History:   reports that he has been smoking cigarettes. He has been smoking about 0.25 packs per day. He has never used smokeless tobacco. He reports that he does not drink alcohol or use drugs. Family history:  family history is not on file.     Allergies   Allergen Reactions    Pcn [Penicillins]      diarrhea Review of Systems:   All 14 systems were reviewed and are negative  Except for the positive findings  which as documented     /75   Pulse 79   Temp 98.2 °F (36.8 °C) (Oral)   Resp 15   Ht 5' 7\" (1.702 m)   Wt 172 lb 2.9 oz (78.1 kg)   SpO2 100%   BMI 26.97 kg/m²       Intake/Output Summary (Last 24 hours) at 3/25/2019 1029  Last data filed at 3/25/2019 0941  Gross per 24 hour   Intake 250 ml   Output --   Net 250 ml       Physical Exam:  Constitutional:  Well developed, Well nourished, No acute distress, Non-toxic appearance. HENT:  Normocephalic, Atraumatic, Bilateral external ears normal, Oropharynx moist, No oral exudates, Nose normal. Neck- Normal range of motion, No tenderness, Supple, No stridor. Eyes:  Refused  Respiratory:  Normal breath sounds, No respiratory distress, No wheezing, No chest tenderness. Cardiovascular:  Normal heart rate, Normal rhythm, No murmurs appreciated, No rubs appreciated, No gallops appreciated, JVP not elevated  Abdomen/GI:  Bowel sounds normal, Soft, No tenderness, No masses, No pulsatile masses. Musculoskeletal:  Intact distal pulses, No edema, No tenderness, No cyanosis, No clubbing. Integument:  Warm, Dry, No erythema, No rash. Lymphatic:  No lymphadenopathy noted.    Neurologic:  Alert & oriented x 3, dizzy  Psychiatric:  Affect  and  Mood: cooperative at this time    Medications:    amLODIPine  5 mg Oral BID    spironolactone  25 mg Oral BID    lisinopril  20 mg Oral BID    sodium chloride flush  10 mL Intravenous 2 times per day    docusate sodium  100 mg Oral BID    metoprolol tartrate  50 mg Oral BID    hydrochlorothiazide  25 mg Oral Daily    heparin (porcine)  5,000 Units Subcutaneous BID       sodium chloride flush, ondansetron, acetaminophen, potassium chloride **OR** potassium alternative oral replacement **OR** potassium chloride, magnesium sulfate    Lab Data:  CBC:   Recent Labs     03/23/19  1216 03/24/19  0344 03/25/19  5726 and I agree with the assessment, diagnosis, and treatment plan with changes made by me as follows     CARDIOLOGY ATTENDING ADDENDUM    HPI:  I have reviewed the above HPI  And agree with above   Romeo Patrick is a 47 y. o.year old who and presents with had concerns including Hypertension (seen here Monday started on meds Monday states no better); Headache; and Shortness of Breath. Chief Complaint   Patient presents with    Hypertension     seen here Monday started on meds Monday states no better    Headache    Shortness of Breath     Interval history:  BP is much improved    Physical Exam:  General:  Awake, alert, NAD  Head:normal  Eye:normal  Neck:  No JVD   Chest:  Clear to auscultation, respiration easy  Cardiovascular:  RRR S1S2  Abdomen:   nontender  Extremities:  no edema  Pulses; palpable  Neuro: grossly normal      MEDICAL DECISION MAKING;    I agree with the above plan, which was planned by myself and discussed with NP.       Jorge Luis Stout MD [facility]

## 2019-03-25 NOTE — PROGRESS NOTES
Hospitalist Progress Note      Name:  Juice Kent /Age/Sex: 1965  (47 y.o. male)   MRN & CSN:  [id number] & [id number] Admission Date/Time: 3/23/2019 12:16 PM   Location:  -A PCP: No primary care provider on file. Hospital Day: 3    Assessment and Plan:   Edwin Neil is a 47 y. o. male who presents with  Hypertension (seen here Monday started on med states no better); Headache; and Shortness of Breath     Hypertensive emergency. presenting BPs 220s/150s   -  IV cardene started, now transitioned to oral medication  -  Started on po ccb/ acei/ aldactone as per nephrology  -  Doubt malignant hypertension at this time, however pending work up - Dr. Dre Umanzor consulted for work up of HUS/TTP  - Patient complaining of lightheadedness and HA - aldactone dose lowered  -  Will try to keep BP in 120/70  -  Echo: LV EF 40-45%, severe concentric LV hypertrophy, Inferior and inferoseptal walls appear hypokinetic  -  Renal MRA pending    Vision Changes likely due to above and chronic HTN  -  Patient refusing opthalmology evaluation at this time    Acute kidney injury- sCr 4.3 with baseline unknown.    -  Cr 4.4 --> 6.6  -  Pending renal MRA      Hypokalemia-3.2- replacement   -Improved 3.8 today     DVT PPx: lovenox      History of Present Illness:     Chief Complaint: Hypertension (seen here Monday started on meds Monday states no better); Headache; and Shortness of Breath    Patient continues to complain of headache. States Excedrin works however discussed with patient that given his current renal status he cannot take Excedrin. States he will continue with Tylenol and does not want any stronger pain medication. Continues to complain of vision problems and states overnight it has gotten worse. He is unable to see the screen on his cell phone. He is refusing an ophthalmology consult at this time. Ten point ROS reviewed negative, unless as noted above    Objective:        Intake/Output Summary (Last 24 hours) at 3/25/2019 1326  Last data filed at 3/25/2019 0941  Gross per 24 hour   Intake 250 ml   Output --   Net 250 ml      Vitals:   Vitals:    03/25/19 1100   BP: 118/84   Pulse: 58   Resp: 20   Temp: 98.4 °F (36.9 °C)   SpO2: 100%     Physical Exam:   General: A&Ox4, no acute distress, lethargic  HENT: atraumatic, normal cephalic  Eyes: PERRLA, EOMI, unable to read cell phone screen. CVS: S1, S2 WNL, RRR,  No murmurs rubs or gallops  Lungs: CTA BL, symmetrical chest expansion  Abdomen: soft, non-tender.   BSx4Q  Neuro: CN2-12 grossly intact, no focal deficits  Psych: appropriate mood and affect     Medications:   Medications:    [START ON 3/26/2019] spironolactone  25 mg Oral Daily    amLODIPine  5 mg Oral BID    sodium chloride flush  10 mL Intravenous 2 times per day    docusate sodium  100 mg Oral BID    metoprolol tartrate  50 mg Oral BID    heparin (porcine)  5,000 Units Subcutaneous BID      Infusions:   PRN Meds:   sodium chloride flush 10 mL PRN   ondansetron 4 mg Q6H PRN   acetaminophen 650 mg Q4H PRN   potassium chloride 40 mEq PRN   Or     potassium alternative oral replacement 40 mEq PRN   Or     potassium chloride 10 mEq PRN   magnesium sulfate 1 g PRN         Electronically signed by Fatuma Aviles MD on 3/25/2019 at 1:26 PM

## 2019-03-25 NOTE — PROGRESS NOTES
Re examined the pt  BP rather low  hold all BP meds  He has sx  250 cc NS    It is very unexpected and interesting his response to BP meds   Will have reintroduce slowly 1 agent at a time   Also review echo MRA  etc

## 2019-03-26 ENCOUNTER — APPOINTMENT (OUTPATIENT)
Dept: MRI IMAGING | Age: 54
DRG: 194 | End: 2019-03-26
Payer: MEDICARE

## 2019-03-26 LAB
ANION GAP SERPL CALCULATED.3IONS-SCNC: 21 MMOL/L (ref 4–16)
APTT: 23.8 SECONDS (ref 21.2–33)
BASOPHILS ABSOLUTE: 0.1 K/CU MM
BASOPHILS RELATIVE PERCENT: 0.6 % (ref 0–1)
BUN BLDV-MCNC: 72 MG/DL (ref 6–23)
CALCIUM SERPL-MCNC: 9.7 MG/DL (ref 8.3–10.6)
CHLORIDE BLD-SCNC: 87 MMOL/L (ref 99–110)
CO2: 24 MMOL/L (ref 21–32)
CREAT SERPL-MCNC: 7.8 MG/DL (ref 0.9–1.3)
DIFFERENTIAL TYPE: ABNORMAL
EOSINOPHILS ABSOLUTE: 0.1 K/CU MM
EOSINOPHILS RELATIVE PERCENT: 0.6 % (ref 0–3)
GFR AFRICAN AMERICAN: 9 ML/MIN/1.73M2
GFR NON-AFRICAN AMERICAN: 7 ML/MIN/1.73M2
GLUCOSE BLD-MCNC: 95 MG/DL (ref 70–99)
HAPTOGLOBIN: 39 MG/DL (ref 30–200)
HAPTOGLOBIN: NORMAL MG/DL (ref 30–200)
HCT VFR BLD CALC: 53.2 % (ref 42–52)
HEMOGLOBIN: 17.8 GM/DL (ref 13.5–18)
IMMATURE NEUTROPHIL %: 0.3 % (ref 0–0.43)
INR BLD: 0.98 INDEX
LYMPHOCYTES ABSOLUTE: 5.2 K/CU MM
LYMPHOCYTES RELATIVE PERCENT: 44 % (ref 24–44)
MCH RBC QN AUTO: 30.9 PG (ref 27–31)
MCHC RBC AUTO-ENTMCNC: 33.5 % (ref 32–36)
MCV RBC AUTO: 92.4 FL (ref 78–100)
MONOCYTES ABSOLUTE: 0.9 K/CU MM
MONOCYTES RELATIVE PERCENT: 7.5 % (ref 0–4)
PDW BLD-RTO: 11.9 % (ref 11.7–14.9)
PLATELET # BLD: 115 K/CU MM (ref 140–440)
POTASSIUM SERPL-SCNC: 3.9 MMOL/L (ref 3.5–5.1)
PROTHROMBIN TIME: 11.2 SECONDS (ref 9.12–12.5)
RBC # BLD: 5.76 M/CU MM (ref 4.6–6.2)
SEGMENTED NEUTROPHILS ABSOLUTE COUNT: 5.5 K/CU MM
SEGMENTED NEUTROPHILS RELATIVE PERCENT: 47 % (ref 36–66)
SODIUM BLD-SCNC: 132 MMOL/L (ref 135–145)
TOTAL IMMATURE NEUTOROPHIL: 0.04 K/CU MM
WBC # BLD: 11.8 K/CU MM (ref 4–10.5)

## 2019-03-26 PROCEDURE — 84165 PROTEIN E-PHORESIS SERUM: CPT

## 2019-03-26 PROCEDURE — 86160 COMPLEMENT ANTIGEN: CPT

## 2019-03-26 PROCEDURE — 85610 PROTHROMBIN TIME: CPT

## 2019-03-26 PROCEDURE — 80048 BASIC METABOLIC PNL TOTAL CA: CPT

## 2019-03-26 PROCEDURE — 94761 N-INVAS EAR/PLS OXIMETRY MLT: CPT

## 2019-03-26 PROCEDURE — C8902 MRA W/O FOL W/CONT, ABD: HCPCS

## 2019-03-26 PROCEDURE — 1200000000 HC SEMI PRIVATE

## 2019-03-26 PROCEDURE — 99232 SBSQ HOSP IP/OBS MODERATE 35: CPT | Performed by: INTERNAL MEDICINE

## 2019-03-26 PROCEDURE — 2580000003 HC RX 258: Performed by: NURSE PRACTITIONER

## 2019-03-26 PROCEDURE — 6370000000 HC RX 637 (ALT 250 FOR IP): Performed by: NURSE PRACTITIONER

## 2019-03-26 PROCEDURE — APPSS30 APP SPLIT SHARED TIME 16-30 MINUTES: Performed by: NURSE PRACTITIONER

## 2019-03-26 PROCEDURE — 85730 THROMBOPLASTIN TIME PARTIAL: CPT

## 2019-03-26 PROCEDURE — 36415 COLL VENOUS BLD VENIPUNCTURE: CPT

## 2019-03-26 PROCEDURE — 85025 COMPLETE CBC W/AUTO DIFF WBC: CPT

## 2019-03-26 RX ADMIN — SODIUM CHLORIDE, PRESERVATIVE FREE 10 ML: 5 INJECTION INTRAVENOUS at 22:05

## 2019-03-26 RX ADMIN — ACETAMINOPHEN 650 MG: 325 TABLET ORAL at 07:57

## 2019-03-26 RX ADMIN — SODIUM CHLORIDE, PRESERVATIVE FREE 10 ML: 5 INJECTION INTRAVENOUS at 08:30

## 2019-03-26 ASSESSMENT — PAIN SCALES - GENERAL
PAINLEVEL_OUTOF10: 0
PAINLEVEL_OUTOF10: 0
PAINLEVEL_OUTOF10: 10
PAINLEVEL_OUTOF10: 10
PAINLEVEL_OUTOF10: 8

## 2019-03-26 ASSESSMENT — PAIN DESCRIPTION - LOCATION: LOCATION: HEAD

## 2019-03-26 ASSESSMENT — PAIN DESCRIPTION - FREQUENCY: FREQUENCY: CONTINUOUS

## 2019-03-26 NOTE — PROGRESS NOTES
[place] [facility]  Fady Meyer  Phone: [phone]    Fax [phone]                  Araceli Odom MD, Jessica Mccloud MD, Maciel Ness MD, MD Andrew Huston MD Lesa Mesi, MD MALLARD FILLMORE GATES, APRBELIA Robertson, NITIN Condon, APRN    Cardiology Progress Note     Today's Plan: continue medications    Admit Date:  3/23/2019    Consult reason/ Seen today for: Hypertensive Emergency and abnormal troponin  Subjective and  Overnight Events:  Edwin feels dizzy and has significant photosensitivity. He is very frustrated this morning. He wants to know why his vision is gone in his good eye. Discussed allowing opthalmology to see him. He does not want to see them at this time. He is willing to have cardiolite stress test today. He feels overwhelmed at the loss of his vision at this time. Assessment / Plan / Recommendation:     1. Elevated Troponin: most likely type 2. Trending down. Will check Cardiolite as he is agreeable today. Echo is . 2. HTN: stable, Dr Marcelino Ji is managing      History of Presenting Illness: Colton Delgadillo is a  47 y. o.year old who is admitted for  Chief Complaint   Patient presents with    Hypertension     seen here Monday started on meds Monday states no better    Headache    Shortness of Breath        Past medical history:    has a past medical history of Back ache and Hypertension. Past surgical history:   has no past surgical history on file. Social History:   reports that he has been smoking cigarettes. He has been smoking about 0.25 packs per day. He has never used smokeless tobacco. He reports that he does not drink alcohol or use drugs. Family history:  family history is not on file.     Allergies   Allergen Reactions    Pcn [Penicillins]      diarrhea       Review of Systems:   All 14 systems were reviewed and are negative  Except for the positive findings  which as documented /80   Pulse 75   Temp 98.6 °F (37 °C) (Oral)   Resp 16   Ht 5' 7\" (1.702 m)   Wt 172 lb 12.8 oz (78.4 kg)   SpO2 99%   BMI 27.06 kg/m²       Intake/Output Summary (Last 24 hours) at 3/26/2019 1033  Last data filed at 3/26/2019 0442  Gross per 24 hour   Intake 380 ml   Output --   Net 380 ml       Physical Exam:  Constitutional:  Well developed, Well nourished, No acute distress, Non-toxic appearance. HENT:  Normocephalic, Atraumatic, Bilateral external ears normal, Oropharynx moist, No oral exudates, Nose normal. Neck- Normal range of motion, No tenderness, Supple, No stridor. Eyes:  Refused   Respiratory:  Normal breath sounds, No respiratory distress, No wheezing, No chest tenderness. Cardiovascular:  Normal heart rate, Normal rhythm, No murmurs appreciated, No rubs appreciated, No gallops appreciated, JVP not elevated  Abdomen/GI:  Bowel sounds normal, Soft, No tenderness, No masses, No pulsatile masses. Musculoskeletal:  Intact distal pulses, No edema, No tenderness, No cyanosis, No clubbing. Integument:  Warm, Dry, No erythema, No rash. Lymphatic:  No lymphadenopathy noted.    Neurologic:  Alert & oriented x 3, light headed with position change  Psychiatric:  Affect  and  Mood: cooperative at this time, tearful    Medications:    sodium chloride flush  10 mL Intravenous 2 times per day    docusate sodium  100 mg Oral BID    heparin (porcine)  5,000 Units Subcutaneous BID       sodium chloride flush, ondansetron, acetaminophen, potassium chloride **OR** potassium alternative oral replacement **OR** potassium chloride, magnesium sulfate    Lab Data:  CBC:   Recent Labs     03/24/19  0344 03/25/19  0832 03/26/19  0612   WBC 12.9* 11.0* 11.8*   HGB 17.4 17.3 17.8   HCT 51.3 52.4* 53.2*   MCV 92.4 92.9 92.4   * 103* 115*     BMP:   Recent Labs     03/24/19  0344 03/25/19  0832 03/26/19  0612   * 126* 132*   K 3.2* 3.8 3.9   CL 86* 87* 87*   CO2 28 24 24   PHOS  --  6.3*  -- BUN 49* 63* 72*   CREATININE 4.4* 6.6* 7.8*     PT/INR:   Recent Labs     03/26/19  0612   PROTIME 11.2   INR 0.98     BNP:    Recent Labs     03/23/19  1517   PROBNP 6,059*     TROPONIN:   Recent Labs     03/23/19  1517 03/23/19  2058 03/24/19  0344   TROPONINT 0.048* 0.119* 0.090*        ECHO :   Echocardiogram 3/25/2019  Summary   Left ventricular systolic function is mildly depressed.   Ejection fraction is visually estimated at 40 to 45 %.   Severe concentric left ventricular hypertrophy.   Inferior and inferoseptal walls appear hypokinetic.   Grade I diastolic dysfunction.   Essentially normal chamber sizes.   No significant valvular disease noted.   No evidence of any pericardial effusion. Telemetry review: Sinus rhythm. Impression:  Active Problems:    Hypertensive emergency    Malignant hypertension  Resolved Problems:    * No resolved hospital problems. *       All labs, medications and tests reviewed by myself , continue all other medications of all above medical condition listed as is except for changes mentioned above. Thank you very much for consult , please call with questions. Electronically signed by NITIN Leyva CNP on 3/26/2019 at 10:33 AM      I have seen ,spoken to  and examined this patient personally, independently of the nurse practitioner. I have reviewed the hospital care given to date and reviewed all pertinent labs and imaging. The plan was developed mutually at the time of the visit with the patient, Clinical Nurse Practitioner  and myself. I have spoken with patient, nursing staff and provided written and verbal instructions . The above note has been reviewed and I agree with the assessment, diagnosis, and treatment plan with changes made by me as follows     CARDIOLOGY ATTENDING ADDENDUM    HPI:  I have reviewed the above HPI  And agree with above   Miriam Chambers is a 47 y. o.year old who and presents with had concerns including Hypertension (seen here Monday started on meds Monday states no better); Headache; and Shortness of Breath. Chief Complaint   Patient presents with    Hypertension     seen here Monday started on meds Monday states no better    Headache    Shortness of Breath     Interval history:  BP is staying low    Physical Exam:  General:  Awake, alert, NAD  Head:normal  Eye:normal  Neck:  No JVD   Chest:  Clear to auscultation, respiration easy  Cardiovascular:  RRR S1S2  Abdomen:   nontender  Extremities:  No  edema  Pulses; palpable  Neuro: grossly normal      MEDICAL DECISION MAKING;    I agree with the above plan, which was planned by myself and discussed with NP.       Angelica Anthony MD [address] [facility]

## 2019-03-26 NOTE — PROGRESS NOTES
Hospitalist Progress Note      Name:  Velma Green /Age/Sex: 1965  (47 y.o. male)   MRN & CSN:  [id number] & [id number] Admission Date/Time: 3/23/2019 12:16 PM   Location:  -A PCP: No primary care provider on file. Hospital Day: 4    Assessment and Plan:   Geri Meadows a 47 y. o. male who presents with  Hypertension (seen here Monday started on med states no better); Headache; and Shortness of Breath     Hypertensive emergency. presenting BPs 220s/150s   -  IV cardene started, now transitioned to oral medication  -  Started on po ccb/ acei/ aldactone as per nephrology  -  Doubt malignant hypertension at this time, however pending work up - Dr. Arun Doyle consulted for work up of HUS/TTP  - Patient complaining of lightheadedness and HA - aldactone dose lowered  -  Will try to keep BP in 120/70  -  Echo: LV EF 40-45%, severe concentric LV hypertrophy, Inferior and inferoseptal walls appear hypokinetic  -  Doubt MAHA, patient seen by hematology  -  Renal MRA pending     Vision Changes likely due to above and chronic HTN  -  Patient refusing opthalmology evaluation at this time    Thrombocytopenia  -  Doubt TTP, stable at this time    Acute kidney injury- sCr 4.3 with baseline unknown.    -  Cr 4.4 --> 6.6  -  Pending renal MRA      Hypokalemia-3.2- replacement   -Improved 3.9 today     DVT PPx: lovenox      History of Present Illness:     Chief Complaint:  Hypertension (seen here Monday started on med states no better); Headache; and Shortness of Breath    Patient continues to complain of headache. States that Tylenol does not improvement. However refusing to take anything stronger. Patient continues to have visual defects. Discussed need to see ophthalmologist.    Ten point ROS reviewed negative, unless as noted above    Objective:        Intake/Output Summary (Last 24 hours) at 3/26/2019 1336  Last data filed at 3/26/2019 0442  Gross per 24 hour   Intake 260 ml   Output --   Net 260 ml      Vitals:   Vitals:    03/26/19 0750   BP: 112/80   Pulse: 75   Resp: 16   Temp: 98.6 °F (37 °C)   SpO2:      Physical Exam:   General: A&Ox4, no acute distress, lethargic  HENT: atraumatic, normal cephalic  Eyes: PERRLA, EOMI, unable to read cell phone screen. CVS: S1, S2 WNL, RRR,  No murmurs rubs or gallops  Lungs: CTA BL, symmetrical chest expansion  Abdomen: soft, non-tender.   BSx4Q  Neuro: CN2-12 grossly intact, no focal deficits  Psych: appropriate mood and affect   Medications:   Medications:    sodium chloride flush  10 mL Intravenous 2 times per day    docusate sodium  100 mg Oral BID    heparin (porcine)  5,000 Units Subcutaneous BID      Infusions:   PRN Meds:   sodium chloride flush 10 mL PRN   ondansetron 4 mg Q6H PRN   acetaminophen 650 mg Q4H PRN   potassium chloride 40 mEq PRN   Or     potassium alternative oral replacement 40 mEq PRN   Or     potassium chloride 10 mEq PRN   magnesium sulfate 1 g PRN         Electronically signed by Axel Peguero MD on 3/26/2019 at 1:36 PM

## 2019-03-26 NOTE — PROGRESS NOTES
PS with few schistocytes, 2-4 per HPF. Few stomatocytes. Acanthocytes. No spherocytes. Few giant platelets. No platelet clumping.    Thrombocytopenia: hemolytic parameters and labs not suggestive of maha. ADAMTS 13 pending. EMILIO pending. Counts stable.   Dick Neighbors   Continue other medical care     ANUJ

## 2019-03-27 ENCOUNTER — APPOINTMENT (OUTPATIENT)
Dept: NUCLEAR MEDICINE | Age: 54
DRG: 194 | End: 2019-03-27
Payer: MEDICARE

## 2019-03-27 VITALS
BODY MASS INDEX: 27.12 KG/M2 | HEIGHT: 67 IN | TEMPERATURE: 97.5 F | RESPIRATION RATE: 16 BRPM | HEART RATE: 69 BPM | DIASTOLIC BLOOD PRESSURE: 56 MMHG | OXYGEN SATURATION: 96 % | SYSTOLIC BLOOD PRESSURE: 108 MMHG | WEIGHT: 172.8 LBS

## 2019-03-27 LAB
ALBUMIN ELP: 3.9 GM/DL (ref 3.2–5.6)
ALBUMIN SERPL-MCNC: 4.4 GM/DL (ref 3.4–5)
ALP BLD-CCNC: 95 IU/L (ref 40–128)
ALPHA-1-GLOBULIN: 0.3 GM/DL (ref 0.1–0.4)
ALPHA-2-GLOBULIN: 0.8 GM/DL (ref 0.4–1.2)
ALT SERPL-CCNC: 17 U/L (ref 10–40)
ANION GAP SERPL CALCULATED.3IONS-SCNC: 25 MMOL/L (ref 4–16)
AST SERPL-CCNC: 12 IU/L (ref 15–37)
BASOPHILS ABSOLUTE: 0.1 K/CU MM
BASOPHILS RELATIVE PERCENT: 0.6 % (ref 0–1)
BETA GLOBULIN: 1 GM/DL (ref 0.5–1.3)
BILIRUB SERPL-MCNC: 0.6 MG/DL (ref 0–1)
BUN BLDV-MCNC: 89 MG/DL (ref 6–23)
CALCIUM SERPL-MCNC: 9.3 MG/DL (ref 8.3–10.6)
CHLORIDE BLD-SCNC: 86 MMOL/L (ref 99–110)
CO2: 22 MMOL/L (ref 21–32)
COMPLEMENT C3: 121 MG/DL (ref 88–201)
COMPLEMENT C3: NORMAL MG/DL (ref 88–201)
COMPLEMENT C4: 46 MG/DL (ref 10–40)
COMPLEMENT C4: ABNORMAL MG/DL (ref 10–40)
CREAT SERPL-MCNC: 10.2 MG/DL (ref 0.9–1.3)
DAT IGG: NEGATIVE
DIFFERENTIAL TYPE: ABNORMAL
EOSINOPHILS ABSOLUTE: 0 K/CU MM
EOSINOPHILS RELATIVE PERCENT: 0.4 % (ref 0–3)
FOLATE: 10.4 NG/ML (ref 3.1–17.5)
GAMMA GLOBULIN: 1.2 GM/DL (ref 0.5–1.6)
GFR AFRICAN AMERICAN: 7 ML/MIN/1.73M2
GFR NON-AFRICAN AMERICAN: 5 ML/MIN/1.73M2
GLUCOSE BLD-MCNC: 94 MG/DL (ref 70–99)
HCT VFR BLD CALC: 50.2 % (ref 42–52)
HEMOGLOBIN: 17 GM/DL (ref 13.5–18)
IMMATURE NEUTROPHIL %: 0.4 % (ref 0–0.43)
LV EF: 18 %
LVEF MODALITY: NORMAL
LYMPHOCYTES ABSOLUTE: 3.8 K/CU MM
LYMPHOCYTES RELATIVE PERCENT: 34.4 % (ref 24–44)
MCH RBC QN AUTO: 31.3 PG (ref 27–31)
MCHC RBC AUTO-ENTMCNC: 33.9 % (ref 32–36)
MCV RBC AUTO: 92.3 FL (ref 78–100)
MONOCYTES ABSOLUTE: 0.9 K/CU MM
MONOCYTES RELATIVE PERCENT: 8 % (ref 0–4)
NUCLEATED RBC %: 0 %
PDW BLD-RTO: 12.2 % (ref 11.7–14.9)
PLATELET # BLD: 121 K/CU MM (ref 140–440)
POTASSIUM SERPL-SCNC: 3.5 MMOL/L (ref 3.5–5.1)
RBC # BLD: 5.44 M/CU MM (ref 4.6–6.2)
SCLERODERMA (SCL-70) AB: 1 AU/ML (ref 0–40)
SCLERODERMA (SCL-70) AB: NORMAL AU/ML (ref 0–40)
SEGMENTED NEUTROPHILS ABSOLUTE COUNT: 6.3 K/CU MM
SEGMENTED NEUTROPHILS RELATIVE PERCENT: 56.2 % (ref 36–66)
SODIUM BLD-SCNC: 133 MMOL/L (ref 135–145)
SPEP INTERPRETATION: NORMAL
TOTAL IMMATURE NEUTOROPHIL: 0.05 K/CU MM
TOTAL NUCLEATED RBC: 0 K/CU MM
TOTAL PROTEIN: 7.1 GM/DL (ref 6.4–8.2)
TOTAL PROTEIN: 7.2 GM/DL (ref 6.4–8.2)
VITAMIN B-12: 573.9 PG/ML (ref 211–911)
WBC # BLD: 11.1 K/CU MM (ref 4–10.5)

## 2019-03-27 PROCEDURE — 78452 HT MUSCLE IMAGE SPECT MULT: CPT

## 2019-03-27 PROCEDURE — 3430000000 HC RX DIAGNOSTIC RADIOPHARMACEUTICAL: Performed by: INTERNAL MEDICINE

## 2019-03-27 PROCEDURE — 6370000000 HC RX 637 (ALT 250 FOR IP): Performed by: NURSE PRACTITIONER

## 2019-03-27 PROCEDURE — 80053 COMPREHEN METABOLIC PANEL: CPT

## 2019-03-27 PROCEDURE — 36415 COLL VENOUS BLD VENIPUNCTURE: CPT

## 2019-03-27 PROCEDURE — 6360000002 HC RX W HCPCS: Performed by: INTERNAL MEDICINE

## 2019-03-27 PROCEDURE — 99232 SBSQ HOSP IP/OBS MODERATE 35: CPT | Performed by: INTERNAL MEDICINE

## 2019-03-27 PROCEDURE — A9500 TC99M SESTAMIBI: HCPCS | Performed by: INTERNAL MEDICINE

## 2019-03-27 PROCEDURE — 94761 N-INVAS EAR/PLS OXIMETRY MLT: CPT

## 2019-03-27 PROCEDURE — 86880 COOMBS TEST DIRECT: CPT

## 2019-03-27 PROCEDURE — 85025 COMPLETE CBC W/AUTO DIFF WBC: CPT

## 2019-03-27 PROCEDURE — APPSS30 APP SPLIT SHARED TIME 16-30 MINUTES: Performed by: NURSE PRACTITIONER

## 2019-03-27 PROCEDURE — 93017 CV STRESS TEST TRACING ONLY: CPT

## 2019-03-27 PROCEDURE — 82607 VITAMIN B-12: CPT

## 2019-03-27 PROCEDURE — 82746 ASSAY OF FOLIC ACID SERUM: CPT

## 2019-03-27 RX ORDER — AMINOPHYLLINE DIHYDRATE 25 MG/ML
500 INJECTION, SOLUTION INTRAVENOUS ONCE
Status: COMPLETED | OUTPATIENT
Start: 2019-03-27 | End: 2019-03-27

## 2019-03-27 RX ADMIN — Medication 30 MILLICURIE: at 08:35

## 2019-03-27 RX ADMIN — Medication 10 MILLICURIE: at 07:35

## 2019-03-27 RX ADMIN — ACETAMINOPHEN 650 MG: 325 TABLET ORAL at 05:06

## 2019-03-27 RX ADMIN — AMINOPHYLLINE 75 MG: 25 INJECTION, SOLUTION INTRAVENOUS at 09:39

## 2019-03-27 RX ADMIN — REGADENOSON 0.4 MG: 0.08 INJECTION, SOLUTION INTRAVENOUS at 09:29

## 2019-03-27 ASSESSMENT — PAIN SCALES - GENERAL
PAINLEVEL_OUTOF10: 0
PAINLEVEL_OUTOF10: 10
PAINLEVEL_OUTOF10: 5

## 2019-03-27 ASSESSMENT — PAIN DESCRIPTION - ONSET: ONSET: AWAKENED FROM SLEEP

## 2019-03-27 ASSESSMENT — PAIN DESCRIPTION - PAIN TYPE: TYPE: ACUTE PAIN

## 2019-03-27 ASSESSMENT — PAIN DESCRIPTION - LOCATION: LOCATION: HEAD

## 2019-03-27 ASSESSMENT — PAIN - FUNCTIONAL ASSESSMENT: PAIN_FUNCTIONAL_ASSESSMENT: ACTIVITIES ARE NOT PREVENTED

## 2019-03-27 ASSESSMENT — PAIN DESCRIPTION - DESCRIPTORS: DESCRIPTORS: ACHING;THROBBING

## 2019-03-27 ASSESSMENT — PAIN DESCRIPTION - ORIENTATION: ORIENTATION: OTHER (COMMENT)

## 2019-03-27 ASSESSMENT — PAIN DESCRIPTION - PROGRESSION: CLINICAL_PROGRESSION: GRADUALLY IMPROVING

## 2019-03-27 ASSESSMENT — PAIN DESCRIPTION - FREQUENCY: FREQUENCY: CONTINUOUS

## 2019-03-27 NOTE — PROGRESS NOTES
Pt seen and examined  \"Am I an Allien, then why cant I see you\"  Vision little better  HA since last 2 days, worse last night  No fever  No bleeding or any rash    aao x 3  ctab  s1s2 soft ntnd bs pos  No LE edema    3/26/2019 WBC of 11.8 hemoglobin of 17.8 hematocrit of 53.2 MCV of 92.4 and platelets of 508 K CMP with a sodium of 132 potassium 3.9 BUN of 72 and creatinine of 7.8  Pt 11.2, ptt 23    ADAMTS 13 pending  And a scleroderma antibody negative  C4 46 serum protein electrophoresis pending. EMILIO ordered. B12 and folate pending 2. PS with few schistocytes, 2-4 per HPF. Few stomatocytes. Acanthocytes. No spherocytes. Few giant platelets. No platelet clumping. Thrombocytopenia: hemolytic parameters and labs not suggestive of maha. ADAMTS 13 pending. EMILIO pending. Counts pending from today.   KOFFI/HTN: is being followed by nephrology  Vision loss: Recommend eval   Continue other medical care  Will continue to follow  ANUJ

## 2019-03-27 NOTE — DISCHARGE SUMMARY
Discharge Summary    Name:  Kalina Lea /Age/Sex: 1965  (47 y.o. male)   MRN & CSN:  [id number] & [id number] Admission Date/Time: 3/23/2019 12:16 PM   Attending:  No att. providers found Discharging Physician: Berenice Lyons MD     Discharge Date: 3.27.19    Admission diagnosis -  Hypertensive Emergency  Acute Kidney Injury    Discharge Diagnosis -   Hypertensive Emergency  Severe Cardiomyopathy, likely due to HTN  Vision changes likely due to above and chronic HTN  Thrombocytopenia  KOFIF, baseline unknown  Hypokalemia - replaced     Admission Condition: good    Discharged Condition - good    Indication for admission - HTN Emergency      Hospital Course: Edwin Neil is a 47 y. o. male who presents with  Hypertension, Headache; and Shortness of Breath     Hypertensive emergency.  presenting BPs 220s/150s   -  IV cardene initially started, transitioned to oral medication  -  Started on po ccb/ acei/ aldactone as per nephrology  -  Doubt malignant hypertension -Dr. Ashu Suggs consulted for work up of HUS/TTP  - Patient complaining of lightheadedness and HA - aldactone dose lowered  -  Echo: LV EF 40-45%, severe concentric LV hypertrophy, Inferior and inferoseptal walls appear hypokinetic  -  Doubt MAHA, patient seen by hematology  -  Renal MRA: renal arteries is suboptimal.  No gross evidence of renal artery stenosis  -  All antihypertensives on hold as patient's BP has been low  -  Patient has appointment with Dr. Jose Bhagat on Friday, will readdress medication     Severe Cardiomyopathy, likely due to HTN  -  EF 18%, no ischemia on cardiolyte  -  Cardio would like to start patient on BB, however at this time patient insisting on being discharged  -  Will follow up with Dr. Jose Bhagat on Friday     Vision Changes likely due to above and chronic HTN  -  Patient to see optho as an outpatient      Thrombocytopenia  -  Doubt TTP, stable at this time     Acute kidney injury- sCr 4.3 with baseline unknown.    -  Cr 4.4 --> 6.6-->7.8  -  Follow up with nephrology on Friday      Hypokalemia-3.2- replacement   -Improved 3.9 today      The patient expressed appropriate understanding of and agreement with the discharge recommendations, medications, and plan. Consults this admission:  IP CONSULT TO NEPHROLOGY  IP CONSULT TO HOSPITALIST  IP CONSULT TO CARDIOLOGY  IP CONSULT TO ONCOLOGY  IP CONSULT TO OPHTHALMOLOGY  IP CONSULT TO HOME CARE NEEDS      No Medications on Discharge    Objective Findings at Discharge:   BP (!) 108/56   Pulse 69   Temp 97.5 °F (36.4 °C) (Oral)   Resp 16   Ht 5' 7\" (1.702 m)   Wt 172 lb 12.8 oz (78.4 kg)   SpO2 96%   BMI 27.06 kg/m²            General: A&Ox4, no acute distress, lethargic  HENT: atraumatic, normal cephalic  Eyes: PERRLA, EOMI, unable to read cell phone screen. CVS: S1, S2 WNL, RRR,  No murmurs rubs or gallops  Lungs: CTA BL, symmetrical chest expansion  Abdomen: soft, non-tender.  BSx4Q  Neuro: CN2-12 grossly intact, no focal deficits  Psych: appropriate mood and affect       BMP/CBC  Recent Labs     03/25/19  0832 03/26/19  0612 03/27/19  1511   * 132* 133*   K 3.8 3.9 3.5   CL 87* 87* 86*   CO2 24 24 22   BUN 63* 72* 89*   CREATININE 6.6* 7.8* 10.2*   WBC 11.0* 11.8* 11.1*   HCT 52.4* 53.2* 50.2   * 115* 121*       IMAGING:  3.25.19 Echo  3.27.19 MRA renal   3.28. .19 Cardiolyte     Discharge Time of 45 minutes    Electronically signed by Radha Vaughn MD on 3/27/2019 at 7:30 PM

## 2019-03-27 NOTE — PROGRESS NOTES
[place] [facility] [place]  [address]  Phone: [phone]    Fax [phone]                  Toby Shaw MD, Adelaide Laurent MD, [address] MD, MD Abdiel Coelho MD Arleen Arthur, MD Vola Sill, NITIN Sexton, NITIN Alcala, NITIN    Cardiology Progress Note     Today's Plan: continue medications    Admit Date:  3/23/2019    Consult reason/ Seen today for: Hypertensive Emergency and abnormal troponin  Subjective and  Overnight Events:  Edwin feels very frustrated that he cannot see. He wants to know why his vision is gone in his good eye. Cardiolite stress test done, Severe hypertensive cardiomyopathy. He is agreeable to taking medications to help manage his heart failure. Assessment / Plan / Recommendation:     1. Elevated Troponin: most likely type 2. Cardiolite shows normal perfusion but Cardiolite; EF is 18% and Global hypokinesis. Dr. Deandre Mcginnis says he will start him on beta blocker if his blood pressure is adequate this evening. 2. HTN: stable, Dr Deandre Mcginnis is managing      History of Presenting Illness: Roge Low is a  47 y. o.year old who is admitted for  Chief Complaint   Patient presents with    Hypertension     seen here Monday started on meds Monday states no better    Headache    Shortness of Breath        Past medical history:    has a past medical history of Back ache and Hypertension. Past surgical history:   has no past surgical history on file. Social History:   reports that he has been smoking cigarettes. He has been smoking about 0.25 packs per day. He has never used smokeless tobacco. He reports that he does not drink alcohol or use drugs. Family history:  family history is not on file.     Allergies   Allergen Reactions    Pcn [Penicillins]      diarrhea       Review of Systems:   All 14 systems were reviewed and are negative  Except for the positive findings  which as documented     BP (!) 108/56   Pulse 69   Temp 97.5 °F (36.4 °C) (Oral)   Resp 16   Ht 5' 7\" (1.702 m)   Wt 172 lb 12.8 oz (78.4 kg)   SpO2 96%   BMI 27.06 kg/m²     No intake or output data in the 24 hours ending 03/27/19 1458    Physical Exam:  Constitutional:  Well developed, Well nourished, No acute distress, Non-toxic appearance. HENT:  Normocephalic, Atraumatic, Bilateral external ears normal, Oropharynx moist, No oral exudates, Nose normal. Neck- Normal range of motion, No tenderness, Supple, No stridor. Eyes:  Refused   Respiratory:  Normal breath sounds, No respiratory distress, No wheezing, No chest tenderness. Cardiovascular:  Normal heart rate, Normal rhythm, No murmurs appreciated, No rubs appreciated, No gallops appreciated, JVP not elevated  Abdomen/GI:  Bowel sounds normal, Soft, No tenderness, No masses, No pulsatile masses. Musculoskeletal:  Intact distal pulses, No edema, No tenderness, No cyanosis, No clubbing. Integument:  Warm, Dry, No erythema, No rash. Lymphatic:  No lymphadenopathy noted. Neurologic:  Alert & oriented x 3  Psychiatric:  Affect  and  Mood: cooperative at this time    Medications:    sodium chloride flush  10 mL Intravenous 2 times per day    docusate sodium  100 mg Oral BID    heparin (porcine)  5,000 Units Subcutaneous BID       sodium chloride flush, ondansetron, acetaminophen, potassium chloride **OR** potassium alternative oral replacement **OR** potassium chloride, magnesium sulfate    Lab Data:  CBC:   Recent Labs     03/25/19  0832 03/26/19  0612   WBC 11.0* 11.8*   HGB 17.3 17.8   HCT 52.4* 53.2*   MCV 92.9 92.4   * 115*     BMP:   Recent Labs     03/25/19  0832 03/26/19  0612   * 132*   K 3.8 3.9   CL 87* 87*   CO2 24 24   PHOS 6.3*  --    BUN 63* 72*   CREATININE 6.6* 7.8*     PT/INR:   Recent Labs     03/26/19  0612   PROTIME 11.2   INR 0.98     BNP:    No results for input(s): PROBNP in the last 72 hours.   TROPONIN:   No results for input(s): TROPONINT in the last 72 hours. ECHO :   Echocardiogram 3/25/2019  Summary   Left ventricular systolic function is mildly depressed.   Ejection fraction is visually estimated at 40 to 45 %.   Severe concentric left ventricular hypertrophy.   Inferior and inferoseptal walls appear hypokinetic.   Grade I diastolic dysfunction.   Essentially normal chamber sizes.   No significant valvular disease noted.   No evidence of any pericardial effusion. Telemetry review: Sinus rhythm. Impression:  Active Problems:    Hypertensive emergency    Malignant hypertension  Resolved Problems:    * No resolved hospital problems. *       All labs, medications and tests reviewed by myself , continue all other medications of all above medical condition listed as is except for changes mentioned above. Thank you very much for consult , please call with questions. Electronically signed by NITIN Leyva CNP on 3/27/2019 at 2:58 PM      I have seen ,spoken to  and examined this patient personally, independently of the nurse practitioner. I have reviewed the hospital care given to date and reviewed all pertinent labs and imaging. The plan was developed mutually at the time of the visit with the patient, Clinical Nurse Practitioner  and myself. I have spoken with patient, nursing staff and provided written and verbal instructions . The above note has been reviewed and I agree with the assessment, diagnosis, and treatment plan with changes made by me as follows     CARDIOLOGY ATTENDING ADDENDUM    HPI:  I have reviewed the above HPI  And agree with above   Miriam Chambers is a 47 y. o.year old who and presents with had concerns including Hypertension (seen here Monday started on meds Monday states no better); Headache; and Shortness of Breath.   Chief Complaint   Patient presents with    Hypertension     seen here Monday started on meds Monday states no better    Headache    Shortness of Breath     Interval history: Says cannot see    Physical Exam:  General:  Awake, alert, NAD  Head:normal  Eye:normal  Neck:  No JVD   Chest:  Clear to auscultation, respiration easy  Cardiovascular:  RRR S1S2  Abdomen:   nontender  Extremities:  no edema  Pulses; palpable  Neuro: grossly normal      MEDICAL DECISION MAKING;    I agree with the above plan, which was planned by myself and discussed with NP.       Bushra Donnelly MD [facility] [place]

## 2019-03-27 NOTE — PROGRESS NOTES
Nephrology Progress Note  3/27/2019 12:27 PM        Subjective:   Admit Date: 3/23/2019  PCP: No primary care provider on file. Interval History: no LH     Diet: some     ROS:  No co, had tress test    Data:     Current med's:    sodium chloride flush  10 mL Intravenous 2 times per day    docusate sodium  100 mg Oral BID    heparin (porcine)  5,000 Units Subcutaneous BID           No intake/output data recorded. CBC:   Recent Labs     03/25/19  0832 03/26/19  0612   WBC 11.0* 11.8*   HGB 17.3 17.8   * 115*          Recent Labs     03/25/19  0832 03/26/19  0612   * 132*   K 3.8 3.9   CL 87* 87*   CO2 24 24   BUN 63* 72*   CREATININE 6.6* 7.8*   GLUCOSE 94 95       Lab Results   Component Value Date    CALCIUM 9.7 03/26/2019    PHOS 6.3 (H) 03/25/2019       Objective:     Vitals: BP (!) 108/56   Pulse 69   Temp 97.5 °F (36.4 °C) (Oral)   Resp 16   Ht 5' 7\" (1.702 m)   Wt 172 lb 12.8 oz (78.4 kg)   SpO2 96%   BMI 27.06 kg/m²     General appearance:  No distress  HEENT: no pallor, Lt eye vision loss/  Neck:   Lungs:  supple  Heart:  RRR  Abdomen: soft  Extremities:  No edema       Problem List :         Impression :     1. KOFFI/ probable CKD - higher cr likely from sudden lower BP   2. His haptoglobin  nl and SS serology nl- MRA showed no  MARCELL- likely has long standing untreated HTN as evidenced by Echo and EKG  3. HTN- manual  /70 supine - does drop to 118/70 with standing- will not add any med's   4. Low PLT- unlikely from HUS/TTP- PLT better as off yesterday     Recommendation/Plan  :     1. He is insisting leaving hospital - will let him go  2. I have made an apt for him this Friday at 10 : 39 AM   3. I can start BP med's and if he agrees  get BMP and closely f/U as an  out pt basis  4. He did not want BMP drawn this am and now   5.  OK to d/C from renal standpoint       Daniel Aguirre MD

## 2019-03-27 NOTE — PROGRESS NOTES
Hospitalist Progress Note      Name:  Óscar Espinoza /Age/Sex: 1965  (47 y.o. male)   MRN & CSN:  [id number] & [id number] Admission Date/Time: 3/23/2019 12:16 PM   Location:  [address] [place] [address]- PCP: No primary care provider on file. Hospital Day: 5    Assessment and Plan:   Edwin Neil is a 47 y. o. male who presents with  Hypertension (seen here Monday started on med states no better); Headache; and Shortness of Breath     Hypertensive emergency. presenting BPs 220s/150s   -  IV cardene initially started, transitioned to oral medication  -  Started on po ccb/ acei/ aldactone as per nephrology  -  Doubt malignant hypertension at this time, however pending work up - Dr. Madelon Spurling consulted for work up of HUS/TTP  - Patient complaining of lightheadedness and HA - aldactone dose lowered  -  Will try to keep BP in 120/70  -  Echo: LV EF 40-45%, severe concentric LV hypertrophy, Inferior and inferoseptal walls appear hypokinetic  -  Doubt MAHA, patient seen by hematology  -  Renal MRA: renal arteries is suboptimal.  No gross evidence of renal artery stenosis    Severe Cardiomyopathy, likely due to HTN  -  EF 18%, no ischemia on cardiolyte     Vision Changes likely due to above and chronic HTN  -  Patient to see optho as an outpatient      Thrombocytopenia  -  Doubt TTP, stable at this time     Acute kidney injury- sCr 4.3 with baseline unknown.    -  Cr 4.4 --> 6.6-->7.8      Hypokalemia-3.2- replacement   -Improved 3.9 today     DVT PPx: lovenox         History of Present Illness:     Chief Complaint: Rach Conrad a 47 y. o. male who presents with  Hypertension (seen here Monday started on med states no better); Headache; and Shortness of Breath    Patient seem the same. Continues to complain of headache. Refusing to take any other medication than Tylenol. Discussed with patient possible discharge stated that he needs help getting back to [facility].   Patient also had a Cardiolite done this a.m. with abnormal results. As a result nephrology and cardiology would like to keep patient overnight and observe BP on new medication    Ten point ROS reviewed negative, unless as noted above    Objective:   No intake or output data in the 24 hours ending 03/27/19 1434   Vitals:   Vitals:    03/27/19 1045   BP: (!) 108/56   Pulse: 69   Resp: 16   Temp: 97.5 °F (36.4 °C)   SpO2:      Physical Exam:   General: A&Ox4, no acute distress, lethargic  HENT: atraumatic, normal cephalic  Eyes: PERRLA, EOMI, unable to read cell phone screen. CVS: S1, S2 WNL, RRR,  No murmurs rubs or gallops  Lungs: CTA BL, symmetrical chest expansion  Abdomen: soft, non-tender.   BSx4Q  Neuro: CN2-12 grossly intact, no focal deficits  Psych: appropriate mood and affect     Medications:   Medications:    sodium chloride flush  10 mL Intravenous 2 times per day    docusate sodium  100 mg Oral BID    heparin (porcine)  5,000 Units Subcutaneous BID      Infusions:   PRN Meds:   sodium chloride flush 10 mL PRN   ondansetron 4 mg Q6H PRN   acetaminophen 650 mg Q4H PRN   potassium chloride 40 mEq PRN   Or     potassium alternative oral replacement 40 mEq PRN   Or     potassium chloride 10 mEq PRN   magnesium sulfate 1 g PRN         Electronically signed by Joselyn Cushing, MD on 3/27/2019 at 2:34 PM

## 2019-03-28 ENCOUNTER — APPOINTMENT (OUTPATIENT)
Dept: GENERAL RADIOLOGY | Age: 54
End: 2019-03-28
Payer: MEDICARE

## 2019-03-28 ENCOUNTER — APPOINTMENT (OUTPATIENT)
Dept: CT IMAGING | Age: 54
End: 2019-03-28
Payer: MEDICARE

## 2019-03-28 ENCOUNTER — HOSPITAL ENCOUNTER (EMERGENCY)
Age: 54
Discharge: OTHER INSTITUTION | End: 2019-03-28
Attending: EMERGENCY MEDICINE
Payer: MEDICARE

## 2019-03-28 VITALS
TEMPERATURE: 97.8 F | HEART RATE: 76 BPM | DIASTOLIC BLOOD PRESSURE: 94 MMHG | OXYGEN SATURATION: 100 % | BODY MASS INDEX: 27.15 KG/M2 | HEIGHT: 67 IN | RESPIRATION RATE: 14 BRPM | WEIGHT: 173 LBS | SYSTOLIC BLOOD PRESSURE: 146 MMHG

## 2019-03-28 DIAGNOSIS — R77.8 TROPONIN LEVEL ELEVATED: ICD-10-CM

## 2019-03-28 DIAGNOSIS — R51.9 NONINTRACTABLE HEADACHE, UNSPECIFIED CHRONICITY PATTERN, UNSPECIFIED HEADACHE TYPE: ICD-10-CM

## 2019-03-28 DIAGNOSIS — R07.9 CHEST PAIN, UNSPECIFIED TYPE: Primary | ICD-10-CM

## 2019-03-28 DIAGNOSIS — R93.0 ABNORMAL HEAD CT: ICD-10-CM

## 2019-03-28 DIAGNOSIS — H40.9 GLAUCOMA OF LEFT EYE, UNSPECIFIED GLAUCOMA TYPE: ICD-10-CM

## 2019-03-28 DIAGNOSIS — R06.89 DYSPNEA AND RESPIRATORY ABNORMALITIES: ICD-10-CM

## 2019-03-28 DIAGNOSIS — H57.12 PAIN OF LEFT EYE: ICD-10-CM

## 2019-03-28 DIAGNOSIS — R79.89 SERUM CREATININE RAISED: ICD-10-CM

## 2019-03-28 DIAGNOSIS — H53.9 VISION CHANGES: ICD-10-CM

## 2019-03-28 DIAGNOSIS — R06.00 DYSPNEA AND RESPIRATORY ABNORMALITIES: ICD-10-CM

## 2019-03-28 LAB
ADAMTS13 ACTIVITY: 57 %
ADAMTS13 ACTIVITY: ABNORMAL %
ALBUMIN SERPL-MCNC: 4 GM/DL (ref 3.4–5)
ALDOSTERONE: 63.7 NG/DL
ALDOSTERONE: NORMAL NG/DL
ALP BLD-CCNC: 80 IU/L (ref 40–129)
ALT SERPL-CCNC: 17 U/L (ref 10–40)
ANION GAP SERPL CALCULATED.3IONS-SCNC: 18 MMOL/L (ref 4–16)
AST SERPL-CCNC: 13 IU/L (ref 15–37)
BASOPHILS ABSOLUTE: 0.1 K/CU MM
BASOPHILS RELATIVE PERCENT: 0.6 % (ref 0–1)
BILIRUB SERPL-MCNC: 0.5 MG/DL (ref 0–1)
BUN BLDV-MCNC: 100 MG/DL (ref 6–23)
CALCIUM SERPL-MCNC: 8 MG/DL (ref 8.3–10.6)
CHLORIDE BLD-SCNC: 91 MMOL/L (ref 99–110)
CO2: 19 MMOL/L (ref 21–32)
CREAT SERPL-MCNC: 10 MG/DL (ref 0.9–1.3)
DIFFERENTIAL TYPE: ABNORMAL
EOSINOPHILS ABSOLUTE: 0 K/CU MM
EOSINOPHILS RELATIVE PERCENT: 0.1 % (ref 0–3)
ERYTHROCYTE SEDIMENTATION RATE: 13 MM/HR (ref 0–20)
GFR AFRICAN AMERICAN: 7 ML/MIN/1.73M2
GFR NON-AFRICAN AMERICAN: 6 ML/MIN/1.73M2
GLUCOSE BLD-MCNC: 113 MG/DL (ref 70–99)
HCT VFR BLD CALC: 48.7 % (ref 42–52)
HEMOGLOBIN: 16.7 GM/DL (ref 13.5–18)
IMMATURE NEUTROPHIL %: 0.4 % (ref 0–0.43)
INR BLD: 1 INDEX
LIPASE: 41 IU/L (ref 13–60)
LYMPHOCYTES ABSOLUTE: 1.8 K/CU MM
LYMPHOCYTES RELATIVE PERCENT: 17.8 % (ref 24–44)
MAGNESIUM: 3.5 MG/DL (ref 1.8–2.4)
MCH RBC QN AUTO: 31.2 PG (ref 27–31)
MCHC RBC AUTO-ENTMCNC: 34.3 % (ref 32–36)
MCV RBC AUTO: 91 FL (ref 78–100)
METANEPH/PLASMA INTERP: ABNORMAL
METANEPH/PLASMA INTERP: ABNORMAL
METANEPHRINE, PLASMA: 0.66 NMOL/L (ref 0–0.49)
MONOCYTES ABSOLUTE: 0.7 K/CU MM
MONOCYTES RELATIVE PERCENT: 7.1 % (ref 0–4)
NORMETANEPHRINE PLASMA: 2.63 NMOL/L (ref 0–0.89)
NUCLEATED RBC %: 0 %
PDW BLD-RTO: 12.1 % (ref 11.7–14.9)
PLATELET # BLD: 133 K/CU MM (ref 140–440)
POTASSIUM SERPL-SCNC: 3.5 MMOL/L (ref 3.5–5.1)
PRO-BNP: 1075 PG/ML
PROTHROMBIN TIME: 11.4 SECONDS (ref 9.12–12.5)
RBC # BLD: 5.35 M/CU MM (ref 4.6–6.2)
REASON FOR REJECTION: NORMAL
REASON FOR REJECTION: NORMAL
REJECTED TEST: NORMAL
RENIN PLASMA: 109 NG/ML/HR
RENIN PLASMA: NORMAL NG/ML/HR
SEGMENTED NEUTROPHILS ABSOLUTE COUNT: 7.7 K/CU MM
SEGMENTED NEUTROPHILS RELATIVE PERCENT: 74 % (ref 36–66)
SODIUM BLD-SCNC: 128 MMOL/L (ref 135–145)
TOTAL CK: 48 IU/L (ref 38–174)
TOTAL IMMATURE NEUTOROPHIL: 0.04 K/CU MM
TOTAL NUCLEATED RBC: 0 K/CU MM
TOTAL PROTEIN: 7 GM/DL (ref 6.4–8.2)
TROPONIN T: 0.11 NG/ML
WBC # BLD: 10.4 K/CU MM (ref 4–10.5)

## 2019-03-28 PROCEDURE — 71045 X-RAY EXAM CHEST 1 VIEW: CPT

## 2019-03-28 PROCEDURE — 2580000003 HC RX 258: Performed by: EMERGENCY MEDICINE

## 2019-03-28 PROCEDURE — 83690 ASSAY OF LIPASE: CPT

## 2019-03-28 PROCEDURE — 96367 TX/PROPH/DG ADDL SEQ IV INF: CPT

## 2019-03-28 PROCEDURE — 96361 HYDRATE IV INFUSION ADD-ON: CPT

## 2019-03-28 PROCEDURE — 93010 ELECTROCARDIOGRAM REPORT: CPT | Performed by: INTERNAL MEDICINE

## 2019-03-28 PROCEDURE — 83735 ASSAY OF MAGNESIUM: CPT

## 2019-03-28 PROCEDURE — 85652 RBC SED RATE AUTOMATED: CPT

## 2019-03-28 PROCEDURE — 36415 COLL VENOUS BLD VENIPUNCTURE: CPT

## 2019-03-28 PROCEDURE — 6370000000 HC RX 637 (ALT 250 FOR IP): Performed by: EMERGENCY MEDICINE

## 2019-03-28 PROCEDURE — 83880 ASSAY OF NATRIURETIC PEPTIDE: CPT

## 2019-03-28 PROCEDURE — 6360000002 HC RX W HCPCS: Performed by: EMERGENCY MEDICINE

## 2019-03-28 PROCEDURE — 70450 CT HEAD/BRAIN W/O DYE: CPT

## 2019-03-28 PROCEDURE — 85025 COMPLETE CBC W/AUTO DIFF WBC: CPT

## 2019-03-28 PROCEDURE — 96365 THER/PROPH/DIAG IV INF INIT: CPT

## 2019-03-28 PROCEDURE — 84484 ASSAY OF TROPONIN QUANT: CPT

## 2019-03-28 PROCEDURE — 96375 TX/PRO/DX INJ NEW DRUG ADDON: CPT

## 2019-03-28 PROCEDURE — 85610 PROTHROMBIN TIME: CPT

## 2019-03-28 PROCEDURE — 99285 EMERGENCY DEPT VISIT HI MDM: CPT

## 2019-03-28 PROCEDURE — 93005 ELECTROCARDIOGRAM TRACING: CPT | Performed by: EMERGENCY MEDICINE

## 2019-03-28 PROCEDURE — 80053 COMPREHEN METABOLIC PANEL: CPT

## 2019-03-28 PROCEDURE — 82550 ASSAY OF CK (CPK): CPT

## 2019-03-28 RX ORDER — 0.9 % SODIUM CHLORIDE 0.9 %
1000 INTRAVENOUS SOLUTION INTRAVENOUS ONCE
Status: COMPLETED | OUTPATIENT
Start: 2019-03-28 | End: 2019-03-28

## 2019-03-28 RX ORDER — DIPHENHYDRAMINE HCL 25 MG
25 TABLET ORAL ONCE
Status: DISCONTINUED | OUTPATIENT
Start: 2019-03-28 | End: 2019-03-28

## 2019-03-28 RX ORDER — DIPHENHYDRAMINE HCL 25 MG
50 TABLET ORAL ONCE
Status: COMPLETED | OUTPATIENT
Start: 2019-03-28 | End: 2019-03-28

## 2019-03-28 RX ORDER — TIMOLOL MALEATE 5 MG/ML
1 SOLUTION/ DROPS OPHTHALMIC 2 TIMES DAILY
Status: DISCONTINUED | OUTPATIENT
Start: 2019-03-28 | End: 2019-03-28 | Stop reason: HOSPADM

## 2019-03-28 RX ORDER — TETRACAINE HYDROCHLORIDE 5 MG/ML
1 SOLUTION OPHTHALMIC ONCE
Status: COMPLETED | OUTPATIENT
Start: 2019-03-28 | End: 2019-03-28

## 2019-03-28 RX ORDER — METOCLOPRAMIDE HYDROCHLORIDE 5 MG/ML
10 INJECTION INTRAMUSCULAR; INTRAVENOUS ONCE
Status: COMPLETED | OUTPATIENT
Start: 2019-03-28 | End: 2019-03-28

## 2019-03-28 RX ORDER — LATANOPROST 50 UG/ML
1 SOLUTION/ DROPS OPHTHALMIC ONCE
Status: DISCONTINUED | OUTPATIENT
Start: 2019-03-28 | End: 2019-03-28 | Stop reason: HOSPADM

## 2019-03-28 RX ORDER — MAGNESIUM SULFATE 1 G/100ML
1 INJECTION INTRAVENOUS ONCE
Status: DISCONTINUED | OUTPATIENT
Start: 2019-03-28 | End: 2019-03-28

## 2019-03-28 RX ORDER — HYDROCHLOROTHIAZIDE 25 MG/1
25 TABLET ORAL DAILY
COMMUNITY
End: 2019-06-13

## 2019-03-28 RX ORDER — MORPHINE SULFATE 4 MG/ML
4 INJECTION, SOLUTION INTRAMUSCULAR; INTRAVENOUS EVERY 30 MIN PRN
Status: DISCONTINUED | OUTPATIENT
Start: 2019-03-28 | End: 2019-03-28 | Stop reason: HOSPADM

## 2019-03-28 RX ORDER — ASPIRIN 81 MG/1
324 TABLET, CHEWABLE ORAL ONCE
Status: COMPLETED | OUTPATIENT
Start: 2019-03-28 | End: 2019-03-28

## 2019-03-28 RX ORDER — AMMONIUM LACTATE 12 G/100G
LOTION TOPICAL 2 TIMES DAILY
Refills: 2 | COMMUNITY
Start: 2019-02-21

## 2019-03-28 RX ADMIN — MAGNESIUM SULFATE HEPTAHYDRATE 1 G: 1 INJECTION, SOLUTION INTRAVENOUS at 08:53

## 2019-03-28 RX ADMIN — SODIUM CHLORIDE 1000 ML: 9 INJECTION, SOLUTION INTRAVENOUS at 08:14

## 2019-03-28 RX ADMIN — TETRACAINE HYDROCHLORIDE 1 DROP: 25 LIQUID OPHTHALMIC at 09:18

## 2019-03-28 RX ADMIN — FLUORESCEIN SODIUM 1 MG: 1 STRIP OPHTHALMIC at 09:18

## 2019-03-28 RX ADMIN — MORPHINE SULFATE 4 MG: 4 INJECTION INTRAVENOUS at 08:09

## 2019-03-28 RX ADMIN — ACETAZOLAMIDE 500 MG: 500 INJECTION, POWDER, LYOPHILIZED, FOR SOLUTION INTRAVENOUS at 10:58

## 2019-03-28 RX ADMIN — METOCLOPRAMIDE 10 MG: 5 INJECTION, SOLUTION INTRAMUSCULAR; INTRAVENOUS at 08:09

## 2019-03-28 RX ADMIN — DIPHENHYDRAMINE HCL 50 MG: 25 TABLET ORAL at 08:07

## 2019-03-28 RX ADMIN — ASPIRIN 81 MG 324 MG: 81 TABLET ORAL at 08:07

## 2019-03-28 ASSESSMENT — PAIN DESCRIPTION - FREQUENCY: FREQUENCY: INTERMITTENT

## 2019-03-28 ASSESSMENT — ENCOUNTER SYMPTOMS
EYE PAIN: 1
ALLERGIC/IMMUNOLOGIC NEGATIVE: 1
GASTROINTESTINAL NEGATIVE: 1
EYE REDNESS: 1
PHOTOPHOBIA: 1
SHORTNESS OF BREATH: 1

## 2019-03-28 ASSESSMENT — PAIN DESCRIPTION - ONSET: ONSET: ON-GOING

## 2019-03-28 ASSESSMENT — PAIN SCALES - GENERAL: PAINLEVEL_OUTOF10: 6

## 2019-03-28 ASSESSMENT — PAIN DESCRIPTION - LOCATION: LOCATION: CHEST

## 2019-03-28 NOTE — ED NOTES
Labs needed redrawn on patient, lab called this phlebot. At 0900 to redraw labs      [facility] OLVIN Palacios  03/28/19 0926

## 2019-03-28 NOTE — PROGRESS NOTES
Medication History  [facility]    Patient Name: Anamika Moseley 1965     Medication history has been completed by: Domenic Hyman CPhT    Source(s) of information: patient and insurance claims     Primary Care Physician: No primary care provider on file. Pharmacy: [facility]    Allergies as of 03/28/2019 - Review Complete 03/28/2019   Allergen Reaction Noted    Pcn [penicillins]  03/18/2019        Prior to Admission medications    Medication Sig Start Date End Date Taking? Authorizing Provider   ammonium lactate (LAC-HYDRIN) 12 % lotion Apply topically 2 times daily 2/21/19  Yes Historical Provider, MD   hydrochlorothiazide (HYDRODIURIL) 25 MG tablet Take 25 mg by mouth daily   Yes Historical Provider, MD       Medications added or changed (ex. new medication, dosage change, interval change, formulation change): Complete medication list as stated above     Other Comments:  Patient reports he has taken his medication today.     To my knowledge the above medication history is accurate as of 3/28/2019 10:26 AM.   Domenic Hyman CPhT   3/28/2019 10:26 AM

## 2019-03-28 NOTE — ED PROVIDER NOTES
[facility]      TRIAGE CHIEF COMPLAINT:   Chest Pain; Headache; and Shortness of Breath      Ruby:  Alinda Frankel is a 47 y.o. male that presents with complaint of chest pain headache shortness of breath left eye pain vision changes. Patient states his a hospital recently for hypertensive emergency. States he has been compliant with his medication. He has chronic kidney disease. Patient is a poor historian, complains of a left-sided headache left eye pain and decreased vision for a couple weeks, chest pain shortness of breath. Denies other questions or concerns. REVIEW OF SYSTEMS:  At least 10 systems reviewed and otherwise acutely negative except as in the [address] Review of Systems   Constitutional: Negative. HENT: Negative. Eyes: Positive for photophobia, pain, redness and visual disturbance. Respiratory: Positive for shortness of breath. Cardiovascular: Positive for chest pain. Gastrointestinal: Negative. Endocrine: Negative. Genitourinary: Negative. Musculoskeletal: Negative. Skin: Negative. Allergic/Immunologic: Negative. Neurological: Positive for headaches. Hematological: Negative. Psychiatric/Behavioral: Negative. All other systems reviewed and are negative. Past Medical History:   Diagnosis Date    Back ache     Hypertension      History reviewed. No pertinent surgical history. History reviewed. No pertinent family history.   Social History     Socioeconomic History    Marital status: Single     Spouse name: Not on file    Number of children: Not on file    Years of education: Not on file    Highest education level: Not on file   Occupational History    Not on file   Social Needs    Financial resource strain: Not on file    Food insecurity:     Worry: Not on file     Inability: Not on file    Transportation needs:     Medical: Not on file     Non-medical: Not on file   Tobacco Use    Smoking status: Current Every Day Smoker     Packs/day: 0.25     Types: Cigarettes    Smokeless tobacco: Never Used   Substance and Sexual Activity    Alcohol use: Never     Frequency: Never    Drug use: Never    Sexual activity: Not on file   Lifestyle    Physical activity:     Days per week: Not on file     Minutes per session: Not on file    Stress: Not on file   Relationships    Social connections:     Talks on phone: Not on file     Gets together: Not on file     Attends [employer] service: Not on file     Active member of club or organization: Not on file     Attends meetings of clubs or organizations: Not on file     Relationship status: Not on file    Intimate partner violence:     Fear of current or ex partner: Not on file     Emotionally abused: Not on file     Physically abused: Not on file     Forced sexual activity: Not on file   Other Topics Concern    Not on file   Social History Narrative    Not on file     No current facility-administered medications for this encounter. Current Outpatient Medications   Medication Sig Dispense Refill    ammonium lactate (LAC-HYDRIN) 12 % lotion Apply topically 2 times daily  2    hydrochlorothiazide (HYDRODIURIL) 25 MG tablet Take 25 mg by mouth daily        Allergies   Allergen Reactions    Pcn [Penicillins]      diarrhea     No current facility-administered medications for this encounter. Current Outpatient Medications   Medication Sig Dispense Refill    ammonium lactate (LAC-HYDRIN) 12 % lotion Apply topically 2 times daily  2    hydrochlorothiazide (HYDRODIURIL) 25 MG tablet Take 25 mg by mouth daily         Nursing Notes Reviewed    VITAL SIGNS:  ED Triage Vitals   Enc Vitals Group      BP       Pulse       Resp       Temp       Temp src       SpO2       Weight       Height       Head Circumference       Peak Flow       Pain Score       Pain Loc       Pain Edu? Excl. in [address]?         PHYSICAL EXAM:  Physical Exam   Constitutional: He is oriented to person, place, and time. He appears well-developed and well-nourished. He is active and cooperative. Non-toxic appearance. He does not have a sickly appearance. He appears ill. No distress. HENT:   Head: Normocephalic and atraumatic. Right Ear: External ear normal.   Left Ear: External ear normal.   Mouth/Throat: Uvula is midline and oropharynx is clear and moist. No oropharyngeal exudate, posterior oropharyngeal edema, posterior oropharyngeal erythema or tonsillar abscesses. Eyes: Right eye exhibits no discharge. Left eye exhibits no discharge. Left conjunctiva is injected. Left conjunctiva has no hemorrhage. No scleral icterus. Left eye exhibits normal extraocular motion and no nystagmus. Left pupil is not reactive. Fundoscopic exam:       The left eye shows no exudate and no hemorrhage. Slit lamp exam:       The left eye shows no corneal abrasion, no corneal flare, no corneal ulcer, no foreign body, no hyphema, no hypopyon and no fluorescein uptake. L eye red, fixed dilated pupil, cloudy cornea, pressure 66, 85 L eye, decreased range of motion. Neck: Normal range of motion. No JVD present. Cardiovascular: Normal rate, regular rhythm, normal heart sounds and intact distal pulses. Exam reveals no gallop and no friction rub. No murmur heard. Pulmonary/Chest: Effort normal and breath sounds normal. No stridor. No respiratory distress. He has no wheezes. He has no rales. He exhibits tenderness. Abdominal: Soft. Bowel sounds are normal. He exhibits no distension and no mass. There is generalized tenderness. There is no rigidity, no rebound, no guarding, no tenderness at McBurney's point and negative Cannon's sign. No hernia. Musculoskeletal: Normal range of motion. He exhibits no edema, tenderness or deformity. Neurological: He is alert and oriented to person, place, and time. He has normal strength. He is not disoriented. He displays no atrophy and no tremor. No cranial nerve deficit or sensory deficit.  He exhibits normal muscle tone. He displays no seizure activity. Coordination normal. GCS eye subscore is 4. GCS verbal subscore is 5. GCS motor subscore is 6. Skin: Skin is warm. No rash noted. He is not diaphoretic. No erythema. No pallor. Psychiatric: He has a normal mood and affect. His behavior is normal. Judgment and thought content normal.   Nursing note and vitals reviewed.         I have reviewed andinterpreted all of the currently available lab results from this visit (if applicable):    Results for orders placed or performed during the hospital encounter of 03/28/19   CBC Auto Differential   Result Value Ref Range    WBC 10.4 4.0 - 10.5 K/CU MM    RBC 5.35 4.6 - 6.2 M/CU MM    Hemoglobin 16.7 13.5 - 18.0 GM/DL    Hematocrit 48.7 42 - 52 %    MCV 91.0 78 - 100 FL    MCH 31.2 (H) 27 - 31 PG    MCHC 34.3 32.0 - 36.0 %    RDW 12.1 11.7 - 14.9 %    Platelets 219 (L) 076 - 440 K/CU MM    Differential Type AUTOMATED DIFFERENTIAL     Segs Relative 74.0 (H) 36 - 66 %    Lymphocytes % 17.8 (L) 24 - 44 %    Monocytes % 7.1 (H) 0 - 4 %    Eosinophils % 0.1 0 - 3 %    Basophils % 0.6 0 - 1 %    Segs Absolute 7.7 K/CU MM    Lymphocytes # 1.8 K/CU MM    Monocytes # 0.7 K/CU MM    Eosinophils # 0.0 K/CU MM    Basophils # 0.1 K/CU MM    Nucleated RBC % 0.0 %    Total Nucleated RBC 0.0 K/CU MM    Total Immature Neutrophil 0.04 K/CU MM    Immature Neutrophil % 0.4 0 - 0.43 %   PT - INR   Result Value Ref Range    Protime 11.4 9.12 - 12.5 SECONDS    INR 1.00 INDEX   Sedimentation Rate   Result Value Ref Range    Sed Rate 13 0 - 20 MM/HR   Comprehensive Metabolic Panel   Result Value Ref Range    Sodium 128 (L) 135 - 145 MMOL/L    Potassium 3.5 3.5 - 5.1 MMOL/L    Chloride 91 (L) 99 - 110 mMol/L    CO2 19 (L) 21 - 32 MMOL/L     (H) 6 - 23 MG/DL    CREATININE 10.0 (H) 0.9 - 1.3 MG/DL    Glucose 113 (H) 70 - 99 MG/DL    Calcium 8.0 (L) 8.3 - 10.6 MG/DL    Alb 4.0 3.4 - 5.0 GM/DL    Total Protein 7.0 6.4 - 8.2 GM/DL Total Bilirubin 0.5 0.0 - 1.0 MG/DL    ALT 17 10 - 40 U/L    AST 13 (L) 15 - 37 IU/L    Alkaline Phosphatase 80 40 - 129 IU/L    GFR Non-African American 6 (L) >60 mL/min/1.73m2    GFR  7 (L) >60 mL/min/1.73m2    Anion Gap 18 (H) 4 - 16   CK   Result Value Ref Range    Total CK 48 38 - 174 IU/L   Lipase   Result Value Ref Range    Lipase 41 13 - 60 IU/L   Magnesium   Result Value Ref Range    Magnesium 3.5 (H) 1.8 - 2.4 mg/dl   Brain Natriuretic Peptide   Result Value Ref Range    Pro-BNP 1,075 (H) <300 PG/ML   Troponin   Result Value Ref Range    Troponin T 0.106 (HH) <0.01 NG/ML   SPECIMEN REJECTION   Result Value Ref Range    Rejected Test CMPR MG TROT LIPA CPK PBNP     Reason for Rejection UNABLE TO PERFORM TESTING:     Reason for Rejection SPECIMEN HEMOLYZED    EKG 12 Lead   Result Value Ref Range    Ventricular Rate 77 BPM    Atrial Rate 77 BPM    P-R Interval 166 ms    QRS Duration 88 ms    Q-T Interval 454 ms    QTc Calculation (Bazett) 513 ms    P Axis 61 degrees    R Axis 74 degrees    T Axis 13 degrees    Diagnosis       Normal sinus rhythm  Biatrial enlargement  Prolonged QT  Abnormal ECG  When compared with ECG of 18-MAR-2019 11:48,  No significant change was found  Confirmed by [facility] MD, [facility] ([id number]) on 3/28/2019 3:29:51 PM          Radiographs (if obtained):  [] The following radiograph was interpreted by myself in the absence of a radiologist:  [x] Radiologist's Report Reviewed:    CXR, CT brain    Ct Head Wo Contrast    Result Date: 3/18/2019  EXAMINATION: CT OF THE HEAD WITHOUT CONTRAST  3/18/2019 1:27 pm TECHNIQUE: CT of the head was performed without the administration of intravenous contrast. Dose modulation, iterative reconstruction, and/or weight based adjustment of the mA/kV was utilized to reduce the radiation dose to as low as reasonably achievable. COMPARISON: None.  HISTORY: ORDERING SYSTEM PROVIDED HISTORY: headache TECHNOLOGIST PROVIDED HISTORY: Has a \"code stroke\" or \"stroke alert\" been called? ->No Ordering Physician Provided Reason for Exam: headache; HTN X2 WEEKS Acuity: Acute Type of Exam: Initial Relevant Medical/Surgical History: NONE FINDINGS: BRAIN/VENTRICLES: There is no acute intracranial hemorrhage, mass effect or midline shift. No abnormal extra-axial fluid collection. The gray-white differentiation is maintained without evidence of an acute infarct. There is no evidence of hydrocephalus. There are nonspecific hypoattenuating foci in the subcortical and periventricular white matter that most likely represent chronic microangiopathic ischemic changes in a patient of this age. ORBITS: The visualized portion of the orbits demonstrate no acute abnormality. SINUSES: The visualized paranasal sinuses and mastoid air cells demonstrate no acute abnormality. SOFT TISSUES/SKULL:  No acute abnormality of the visualized skull or soft tissues. No acute intracranial abnormality. Prominent subcortical and periventricular hypoattenuating white matter foci. No acute large vascular territorial infarct. Nm Myocardial Spect Rest Exercise Or Rx    Result Date: 3/27/2019  Cardiac Perfusion Imaging   Demographics   Patient Name       Brody Magana Date of study             03/27/2019   Date of Birth      1965   Gender                    Male   Age                47 year(s)   Race                      Black   Patient Number     [id number]   Room Number               3756   Visit Number       [id number]    Height                    67 inches   Corporate ID       [id number]     Weight                    172 pounds   Accession Number   [id number]                                   [id number] [facility]   Ordering Physician              Interpreting Cardiologist Radha Hensley MD   Conclusions   Summary  Abnormal Study.   Normal perfusion study with normal distribution in all coronal, short, and horizontal axis. Global hypokinesis noted with severely reduced LV function. LVEF is 18 %. Recommendation  Clinical Correlation recommended. Signatures   ------------------------------------------------------------------  Electronically signed by Watson Marquez MD (Interpreting  cardiologist) on 03/27/2019 at 16:32  ------------------------------------------------------------------  Procedure Procedure Type:   Nuclear Stress Test:Pharmacological, Myocardial Perfusion Imaging with  Pharm, NM MYOCARDIAL SPECT REST EXERCISE OR RX  Indications: Dyspnea and Chest pain. Risk Factors   The patient risk factors include:Current - Every day tobacco use and  hypertension. Stress Protocols   Resting ECG  Normal sinus rhythm. Bi atrial enlargement. Resting HR:70 bpm  Resting BP:105/80 mmHg  Stress Protocol:Pharmacologic - Lexiscan  Peak HR:78 bpm                   HR/BP product:9906  Peak BP:127/83 mmHg  Predicted HR: 168 bpm  % of predicted HR: 46   Exercise duration: 01:04 min  Reason for termination:Completed   ECG Findings  No ischemic EKG changes. Arrhythmias  No rhythm abnormality. Symptoms  Headache. Nausea. Dizziness. Procedure Medications   - Lexiscan I.V. bolus (over 15sec.) 0.4 mg admininstered @ 03/27/2019 09:35.   - Aminophylline I.V. bolus (over 15sec.) 75 mg admininstered @ 03/27/2019     09:38. Imaging Protocols   Rest                             Stress   Isotope:Sestamibi 99mTc          Isotope: Sestamibi 99mTc  Isotope dose:10.2 mCi            Isotope dose:32.8 mCi  Administration route: I.V. Administration route: I.V.   Injection Date:03/27/2019 07:35  Injection Date:03/27/2019 09:35  Scan Date:03/27/2019 08:30       Scan Date:03/27/2019 10:30   Technique:        SPECT          Technique:        Gated                                                     SPECT   Perfusion Interpretation   Normal tracer uptake in all segment of myocardium on stress images with  complete redistribution on resting images. Imaging Results    Summed scores     - Summed stress score: 7     - Summed rest score: 3     - Summed difference score:    4   Rest ejection  Ejection fraction:18 %  EDV :89 ml  ESV :73 ml  Stroke volume :16 ml  Medical History   Accession#:  [id number]  Admission Data Admission date: 03/23/2019 Admission Time: 12:16 Hospital Status: Inpatient. Us Retroperitoneal Limited    Result Date: 3/18/2019  EXAMINATION: RETROPERITONEAL ULTRASOUND OF THE KIDNEYS 3/18/2019 COMPARISON: None HISTORY: ORDERING SYSTEM PROVIDED HISTORY: KOFFI TECHNOLOGIST PROVIDED HISTORY: Acuity: Acute Type of Exam: Initial Relevant Medical/Surgical History: uncontrolled htn FINDINGS: Kidneys: The right kidney measures 10.9 cm in length and the left kidney measures 10 cm in length. Renal cortex measures 17-18 mm in thickness. Kidneys demonstrate normal cortical echogenicity. No evidence of hydronephrosis or intrarenal stones. No hydronephrosis. No abnormality in the kidneys. Mra Renal Wo Contrast    Result Date: 3/27/2019  EXAMINATION: MRA OF THE ABDOMEN WITHOUT CONTRAST 3/26/2019 TECHNIQUE: Multiplanar multisequence MRA of the abdomen was performed with the administration of intravenous without contrast. COMPARISON: 03/18/2019 HISTORY: ORDERING SYSTEM PROVIDED HISTORY: HTN, HIGH SUSPICION RENAL VASCULAR AND DIMINISHED RENAL FUNCTION TECHNOLOGIST PROVIDED HISTORY: Ordering Physician Provided Reason for Exam: high blood pressure new onset of blindness - eval renal arteries gfr 7 no contrast ordered Acuity: Unknown Type of Exam: Ongoing FINDINGS: Vasculature: Motion artifact and lack of gadolinium contrast limits evaluation. The origins of the renal arteries appear patent. Evaluation of the distal main renal arteries is limited. Solitary bilateral renal arteries are visualized. No other focal abnormality of the vasculature. Remainder of exam: The lung bases are unremarkable.   No acute abnormality of the intra-abdominal organs. The ureters are nondilated. The bile ducts are nondilated. Tiny renal cysts measuring up to 10 mm require no further follow-up. No hydronephrosis. No evidence of asymmetric renal atrophy. The adrenals are unremarkable. No gross abnormality of the bowel. No focal abnormality in the bones or soft tissues. Gallbladder sludge or small gallstones. Visualization of the renal arteries is suboptimal.  No gross evidence of renal artery stenosis. EKG (if obtained): (All EKG's are interpreted by myself in the absence of a cardiologist)    12 lead EKG per my interpretation:  Normal Sinus Rhythm 77  Axis is   Normal  QTc is  513  There is no specific T wave changes appreciated. There is no specific ST wave changes appreciated. Prior EKG to compare with was not available     Total critical care time today provided was at least 75 minutes. This excludes seperately billable procedure. Critical care time provided for reviewing labs, images,giving meds for glaucoma, consulting med, optho, [employer] [employer] that required close evaluation and/or intervention with concern for patient decompensation. MDM:    Patient here is left sided headache left eye pain decreased vision chest pain shortness of breath for the past couple weeks. Again he is in the hospital recently for hypertensive emergency. Has chronic kidney disease. Patient's a very poor historian. On arrival vital signs are stable he is not hypertensive. Workup performed he has abnormal head CT with possible infarct versus small bleed from press syndrome. He takes no blood thinners. Creatinine stable at 10. His left eye is red it is tender and is cloudy I am worried he has glaucoma. Pressure was 66 and 85 on the left eye that I took I do not see any dye uptake. I did contact ophthalmology did order treatment for glaucoma.   I did talk to hospital medicine given troponin elevation of BNP elevation glaucoma abnormal head CT recommends transfer. I did talk to hospital medicine at [place] and patient transferred there for further evaluation. Otherwise patient stable. CLINICAL IMPRESSION:  Final diagnoses:   Chest pain, unspecified type   Nonintractable headache, unspecified chronicity pattern, unspecified headache type   Vision changes   Dyspnea and respiratory abnormalities   Pain of left eye   Serum creatinine raised   Troponin level elevated   Glaucoma of left eye, unspecified glaucoma type   Abnormal head CT       (Please note that portions of this note may have been completed with a voice recognition program. Efforts were made to edit the dictations but occasionally words aremis-transcribed.)    DISPOSITION REFERRAL (if applicable):  No follow-up provider specified.     DISPOSITION MEDICATIONS (if applicable):  Discharge Medication List as of 3/28/2019  1:28 PM             Nikos Crooks, DO Nikos Crooks,   03/28/19 1557

## 2019-04-01 LAB
EKG ATRIAL RATE: 77 BPM
EKG DIAGNOSIS: NORMAL
EKG P AXIS: 61 DEGREES
EKG P-R INTERVAL: 166 MS
EKG Q-T INTERVAL: 454 MS
EKG QRS DURATION: 88 MS
EKG QTC CALCULATION (BAZETT): 513 MS
EKG R AXIS: 74 DEGREES
EKG T AXIS: 13 DEGREES
EKG VENTRICULAR RATE: 77 BPM

## 2019-04-12 ENCOUNTER — HOSPITAL ENCOUNTER (EMERGENCY)
Age: 54
Discharge: HOME | End: 2019-04-12
Attending: EMERGENCY MEDICINE
Payer: MEDICARE

## 2019-04-12 ENCOUNTER — APPOINTMENT (OUTPATIENT)
Dept: GENERAL RADIOLOGY | Age: 54
End: 2019-04-12
Payer: MEDICARE

## 2019-04-12 ENCOUNTER — APPOINTMENT (OUTPATIENT)
Dept: CT IMAGING | Age: 54
End: 2019-04-12
Payer: MEDICARE

## 2019-04-12 VITALS
WEIGHT: 180 LBS | SYSTOLIC BLOOD PRESSURE: 134 MMHG | RESPIRATION RATE: 16 BRPM | DIASTOLIC BLOOD PRESSURE: 77 MMHG | HEIGHT: 67 IN | HEART RATE: 75 BPM | TEMPERATURE: 98.6 F | BODY MASS INDEX: 28.25 KG/M2 | OXYGEN SATURATION: 99 %

## 2019-04-12 DIAGNOSIS — I10 HYPERTENSION, UNSPECIFIED TYPE: Primary | ICD-10-CM

## 2019-04-12 DIAGNOSIS — R07.9 CHEST PAIN, UNSPECIFIED TYPE: ICD-10-CM

## 2019-04-12 DIAGNOSIS — N18.9 CHRONIC KIDNEY DISEASE, UNSPECIFIED CKD STAGE: ICD-10-CM

## 2019-04-12 DIAGNOSIS — R51.9 NONINTRACTABLE HEADACHE, UNSPECIFIED CHRONICITY PATTERN, UNSPECIFIED HEADACHE TYPE: ICD-10-CM

## 2019-04-12 LAB
ALBUMIN SERPL-MCNC: 4.4 GM/DL (ref 3.4–5)
ALP BLD-CCNC: 86 IU/L (ref 40–129)
ALT SERPL-CCNC: 20 U/L (ref 10–40)
ANION GAP SERPL CALCULATED.3IONS-SCNC: 15 MMOL/L (ref 4–16)
AST SERPL-CCNC: 16 IU/L (ref 15–37)
BASOPHILS ABSOLUTE: 0.1 K/CU MM
BASOPHILS RELATIVE PERCENT: 0.7 % (ref 0–1)
BILIRUB SERPL-MCNC: 1 MG/DL (ref 0–1)
BUN BLDV-MCNC: 47 MG/DL (ref 6–23)
CALCIUM SERPL-MCNC: 9.7 MG/DL (ref 8.3–10.6)
CHLORIDE BLD-SCNC: 96 MMOL/L (ref 99–110)
CO2: 23 MMOL/L (ref 21–32)
CREAT SERPL-MCNC: 3.5 MG/DL (ref 0.9–1.3)
DIFFERENTIAL TYPE: ABNORMAL
EOSINOPHILS ABSOLUTE: 0 K/CU MM
EOSINOPHILS RELATIVE PERCENT: 0.3 % (ref 0–3)
GFR AFRICAN AMERICAN: 22 ML/MIN/1.73M2
GFR NON-AFRICAN AMERICAN: 18 ML/MIN/1.73M2
GLUCOSE BLD-MCNC: 99 MG/DL (ref 70–99)
HCT VFR BLD CALC: 43.9 % (ref 42–52)
HEMOGLOBIN: 14.1 GM/DL (ref 13.5–18)
IMMATURE NEUTROPHIL %: 0.4 % (ref 0–0.43)
LYMPHOCYTES ABSOLUTE: 1.6 K/CU MM
LYMPHOCYTES RELATIVE PERCENT: 12.9 % (ref 24–44)
MCH RBC QN AUTO: 30.3 PG (ref 27–31)
MCHC RBC AUTO-ENTMCNC: 32.1 % (ref 32–36)
MCV RBC AUTO: 94.2 FL (ref 78–100)
MONOCYTES ABSOLUTE: 1.1 K/CU MM
MONOCYTES RELATIVE PERCENT: 8.8 % (ref 0–4)
NUCLEATED RBC %: 0 %
PDW BLD-RTO: 12 % (ref 11.7–14.9)
PLATELET # BLD: 156 K/CU MM (ref 140–440)
PMV BLD AUTO: 14.1 FL (ref 7.5–11.1)
POTASSIUM SERPL-SCNC: 4.4 MMOL/L (ref 3.5–5.1)
RBC # BLD: 4.66 M/CU MM (ref 4.6–6.2)
SEGMENTED NEUTROPHILS ABSOLUTE COUNT: 9.2 K/CU MM
SEGMENTED NEUTROPHILS RELATIVE PERCENT: 76.9 % (ref 36–66)
SODIUM BLD-SCNC: 134 MMOL/L (ref 135–145)
TOTAL IMMATURE NEUTOROPHIL: 0.05 K/CU MM
TOTAL NUCLEATED RBC: 0 K/CU MM
TOTAL PROTEIN: 7.6 GM/DL (ref 6.4–8.2)
TROPONIN T: 0.02 NG/ML
WBC # BLD: 12 K/CU MM (ref 4–10.5)

## 2019-04-12 PROCEDURE — 93010 ELECTROCARDIOGRAM REPORT: CPT | Performed by: INTERNAL MEDICINE

## 2019-04-12 PROCEDURE — 96372 THER/PROPH/DIAG INJ SC/IM: CPT

## 2019-04-12 PROCEDURE — 71045 X-RAY EXAM CHEST 1 VIEW: CPT

## 2019-04-12 PROCEDURE — 6370000000 HC RX 637 (ALT 250 FOR IP): Performed by: EMERGENCY MEDICINE

## 2019-04-12 PROCEDURE — 6370000000 HC RX 637 (ALT 250 FOR IP): Performed by: PHYSICIAN ASSISTANT

## 2019-04-12 PROCEDURE — 80053 COMPREHEN METABOLIC PANEL: CPT

## 2019-04-12 PROCEDURE — 6360000002 HC RX W HCPCS: Performed by: PHYSICIAN ASSISTANT

## 2019-04-12 PROCEDURE — 85025 COMPLETE CBC W/AUTO DIFF WBC: CPT

## 2019-04-12 PROCEDURE — 84484 ASSAY OF TROPONIN QUANT: CPT

## 2019-04-12 PROCEDURE — 70450 CT HEAD/BRAIN W/O DYE: CPT

## 2019-04-12 PROCEDURE — 93005 ELECTROCARDIOGRAM TRACING: CPT | Performed by: PHYSICIAN ASSISTANT

## 2019-04-12 PROCEDURE — 99284 EMERGENCY DEPT VISIT MOD MDM: CPT

## 2019-04-12 RX ORDER — METOPROLOL TARTRATE 50 MG/1
50 TABLET, FILM COATED ORAL ONCE
Status: COMPLETED | OUTPATIENT
Start: 2019-04-12 | End: 2019-04-12

## 2019-04-12 RX ORDER — HYDROCHLOROTHIAZIDE 25 MG/1
25 TABLET ORAL DAILY
Status: DISCONTINUED | OUTPATIENT
Start: 2019-04-12 | End: 2019-04-12

## 2019-04-12 RX ORDER — AMLODIPINE BESYLATE 5 MG/1
10 TABLET ORAL ONCE
Status: COMPLETED | OUTPATIENT
Start: 2019-04-12 | End: 2019-04-12

## 2019-04-12 RX ORDER — METOPROLOL TARTRATE 50 MG/1
50 TABLET, FILM COATED ORAL 2 TIMES DAILY
Qty: 60 TABLET | Refills: 0 | Status: SHIPPED | OUTPATIENT
Start: 2019-04-12 | End: 2019-06-13 | Stop reason: DRUGHIGH

## 2019-04-12 RX ORDER — DIPHENHYDRAMINE HCL 25 MG
25 TABLET ORAL ONCE
Status: COMPLETED | OUTPATIENT
Start: 2019-04-12 | End: 2019-04-12

## 2019-04-12 RX ORDER — HYDRALAZINE HYDROCHLORIDE 25 MG/1
25 TABLET, FILM COATED ORAL ONCE
Status: COMPLETED | OUTPATIENT
Start: 2019-04-12 | End: 2019-04-12

## 2019-04-12 RX ORDER — METOCLOPRAMIDE HYDROCHLORIDE 5 MG/ML
10 INJECTION INTRAMUSCULAR; INTRAVENOUS ONCE
Status: COMPLETED | OUTPATIENT
Start: 2019-04-12 | End: 2019-04-12

## 2019-04-12 RX ORDER — OXYCODONE HYDROCHLORIDE 5 MG/1
5 TABLET ORAL EVERY 8 HOURS PRN
Qty: 12 TABLET | Refills: 0 | Status: SHIPPED | OUTPATIENT
Start: 2019-04-12 | End: 2019-04-15

## 2019-04-12 RX ORDER — HYDRALAZINE HYDROCHLORIDE 25 MG/1
25 TABLET, FILM COATED ORAL 3 TIMES DAILY
Qty: 90 TABLET | Refills: 0 | Status: SHIPPED | OUTPATIENT
Start: 2019-04-12 | End: 2019-06-27 | Stop reason: SDUPTHER

## 2019-04-12 RX ORDER — ASPIRIN 81 MG/1
81 TABLET ORAL DAILY
Qty: 30 TABLET | Refills: 0 | Status: SHIPPED | OUTPATIENT
Start: 2019-04-12 | End: 2019-12-18 | Stop reason: SDUPTHER

## 2019-04-12 RX ORDER — ASPIRIN 81 MG/1
324 TABLET, CHEWABLE ORAL ONCE
Status: DISCONTINUED | OUTPATIENT
Start: 2019-04-12 | End: 2019-04-12

## 2019-04-12 RX ORDER — ASPIRIN 81 MG/1
324 TABLET, CHEWABLE ORAL ONCE
Status: COMPLETED | OUTPATIENT
Start: 2019-04-12 | End: 2019-04-12

## 2019-04-12 RX ORDER — AMLODIPINE BESYLATE 10 MG/1
10 TABLET ORAL DAILY
Qty: 30 TABLET | Refills: 0 | Status: SHIPPED | OUTPATIENT
Start: 2019-04-12 | End: 2019-06-27 | Stop reason: SDUPTHER

## 2019-04-12 RX ADMIN — ASPIRIN 81 MG 324 MG: 81 TABLET ORAL at 08:45

## 2019-04-12 RX ADMIN — HYDRALAZINE HYDROCHLORIDE 25 MG: 25 TABLET, FILM COATED ORAL at 08:46

## 2019-04-12 RX ADMIN — AMLODIPINE BESYLATE 10 MG: 5 TABLET ORAL at 08:45

## 2019-04-12 RX ADMIN — METOPROLOL TARTRATE 50 MG: 50 TABLET ORAL at 08:45

## 2019-04-12 RX ADMIN — METOCLOPRAMIDE 10 MG: 5 INJECTION, SOLUTION INTRAMUSCULAR; INTRAVENOUS at 08:45

## 2019-04-12 RX ADMIN — DIPHENHYDRAMINE HCL 25 MG: 25 TABLET ORAL at 08:45

## 2019-04-12 ASSESSMENT — PAIN DESCRIPTION - LOCATION: LOCATION: HEAD

## 2019-04-12 ASSESSMENT — PAIN DESCRIPTION - PAIN TYPE: TYPE: ACUTE PAIN

## 2019-04-12 NOTE — ED PROVIDER NOTES
I independently examined and evaluated Edwin Neil. In brief their history revealed HTN, noncompliant with meds. Chest pain. Patient discharged from [facility] at the end of March after full neurological evaluation and was started on Norvasc 10, hydralazine 25 in the trip while 51 however has not filled any of his medications nor taken any of them. Their exam revealed gentleman who is awake, alert, oriented ×4. Clear lungs. Soft abdomen. Blood pressures 170s over 110. Heart rate in the 90s. Afebrile. Sats normal.. All diagnostic, treatment, and disposition decisions were made by myself in conjunction with the mid-level provider. Before disposition, questions were sought and answered with the patient. They have voiced understanding and agree with the plan: Patient's CBC. Electrolytes show an improved creatinine from 10 previously to 3.5. Has a chronically elevated troponin which is much lower today 0.023. Just had a recent full workup neuro and cardiology. .  CT head and chest x-ray do not show any acute findings. Did receive his home medications and his blood pressure did come down to the 140s over 90s. Patient will be discharged home but will need to take his medication as prescribed. Social work did see patient. Took his prescription on the pharmacy to get them filled for him. Patient will be discharged home to follow-up with a PCP. For all further details of the patient's emergency department visit, please see the mid-level practitioner's documentation.      The Ekg interpreted by me shows  normal sinus rhythm with a rate of 67  Axis is   Normal  QTc is  normal  LVH      ST Segments: no acute change  No significant change from prior EKG dated 3-           Gwen Schaeffer MD  04/12/19 Ventura Laureano MD  04/12/19 7940

## 2019-04-12 NOTE — CARE COORDINATION
Out-pt pharmacy & was informed pt scripts are ready. Alethea-RN/CM informed pt where he can  scripts and then pt to notify security when he returns; for rides plus. Alethea gave security rides plus form. Pt was ambulating on own- NO difficulty.

## 2019-04-12 NOTE — ED PROVIDER NOTES
Denies swollen glands     All other review of systems are negative  See HPI and nursing notes for additional information     PAST MEDICAL & SURGICAL HISTORY    Past Medical History:   Diagnosis Date    Back ache     Hypertension      History reviewed. No pertinent surgical history. CURRENT MEDICATIONS    Current Outpatient Rx   Medication Sig Dispense Refill    metoprolol tartrate (LOPRESSOR) 50 MG tablet Take 1 tablet by mouth 2 times daily 60 tablet 0    hydrALAZINE (APRESOLINE) 25 MG tablet Take 1 tablet by mouth 3 times daily 90 tablet 0    amLODIPine (NORVASC) 10 MG tablet Take 1 tablet by mouth daily 30 tablet 0    aspirin EC 81 MG EC tablet Take 1 tablet by mouth daily 30 tablet 0    oxyCODONE (ROXICODONE) 5 MG immediate release tablet Take 1 tablet by mouth every 8 hours as needed for Pain for up to 3 days. Intended supply: 3 days.  Take lowest dose possible to manage pain 12 tablet 0    ammonium lactate (LAC-HYDRIN) 12 % lotion Apply topically 2 times daily  2    hydrochlorothiazide (HYDRODIURIL) 25 MG tablet Take 25 mg by mouth daily         ALLERGIES    Allergies   Allergen Reactions    Pcn [Penicillins]      diarrhea       SOCIAL & FAMILY HISTORY    Social History     Socioeconomic History    Marital status: Single     Spouse name: None    Number of children: None    Years of education: None    Highest education level: None   Occupational History    None   Social Needs    Financial resource strain: None    Food insecurity:     Worry: None     Inability: None    Transportation needs:     Medical: None     Non-medical: None   Tobacco Use    Smoking status: Current Every Day Smoker     Packs/day: 0.25     Types: Cigarettes    Smokeless tobacco: Never Used   Substance and Sexual Activity    Alcohol use: Never     Frequency: Never    Drug use: Never    Sexual activity: None   Lifestyle    Physical activity:     Days per week: None     Minutes per session: None    Stress: None Relationships    Social connections:     Talks on phone: None     Gets together: None     Attends Congregation service: None     Active member of club or organization: None     Attends meetings of clubs or organizations: None     Relationship status: None    Intimate partner violence:     Fear of current or ex partner: None     Emotionally abused: None     Physically abused: None     Forced sexual activity: None   Other Topics Concern    None   Social History Narrative    None     History reviewed. No pertinent family history. PHYSICAL EXAM    VITAL SIGNS: BP (!) 176/96   Pulse 85   Temp 98.7 °F (37.1 °C)   Resp 16   Ht 5' 7\" (1.702 m)   Wt 180 lb (81.6 kg)   SpO2 98%   BMI 28.19 kg/m²    Constitutional:  Well developed, well nourished, no acute distress   HENT:  Atraumatic, moist mucus membranes, left eye with diffuse erythema of conjunctiva, cloudy, decreased range of motion  Neck/Lymphatics: supple, no JVD, no swollen nodes  Respiratory:  Lungs Clear, no retractions   Cardiovascular:  Rate regular, regular Rhythm,  no murmurs/rubs/gallops  No carotid bruits or murmurs heard in carotids. Vascular: Radial pulses 2+ equal bilaterally  GI:  Soft, nontender, normal bowel sounds  Musculoskeletal:   No edema, no deformities  Integument:  Skin warm and dry, no petechiae   Neurologic:    - Alert & oriented person, place, time, and situation, no speech difficulties or slurring.  - No obvious gross motor deficits  - Cranial nerves 2-12 grossly intact  - Negative meningeal signs.  - Sensation intact to light touch  - Strength 5/5 in upper and lower extremities bilaterally  - Normal finger to nose test bilaterally  - Normal heel-shin bilaterally  - No pronator drift. - Light touch sensation intact throughout. - Upper and lower extremity DTRs 2+ bilaterally.   - Gait steady and without difficulty  Psych: Pleasant affect, no hallucinations      EKG Interpretation  Please see ED physician's note for EKG (L) >60 mL/min/1.73m2    GFR  22 (L) >60 mL/min/1.73m2    Anion Gap 15 4 - 16   Troponin   Result Value Ref Range    Troponin T 0.023 (H) <0.01 NG/ML   EKG 12 Lead   Result Value Ref Range    Ventricular Rate 67 BPM    Atrial Rate 67 BPM    P-R Interval 162 ms    QRS Duration 88 ms    Q-T Interval 390 ms    QTc Calculation (Bazett) 412 ms    P Axis 66 degrees    R Axis 61 degrees    T Axis 67 degrees    Diagnosis       Sinus rhythm with marked sinus arrhythmia  Possible Left atrial enlargement  Left ventricular hypertrophy  Abnormal ECG  When compared with ECG of 28-MAR-2019 07:41,  T wave inversion no longer evident in Inferior leads  Nonspecific T wave abnormality now evident in Lateral leads  QT has shortened  Confirmed by [facility] MD, Gabriela Cárdenas ([id number]) on 4/12/2019 10:35:56 AM             ED COURSE & MEDICAL DECISION MAKING     Vital signs and nursing notes reviewed during ED course. Patient care and presentation staffed with supervising MD.  All pertinent Lab data and radiographic results reviewed with patient at bedside. Patient presents as above. On arrival, he is hypertensive with blood pressure of 172/110. He is afebrile, not tachycardic, oxygenating 98% on room air. Labs including troponin, EKG, imaging of head and chest obtained. Patient given his previously prescribed blood pressure medications as well as Reglan and Benadryl for her headache. Lab work with mild leukocytosis of 12.0. Creatinine appears patient's baseline at 3.5. Troponin chronically elevated, 0.023 today. EKG without acute changes, interpreted by supervising physician. Head CT without acute intracranial hemorrhage. There is continued hypoattenuation within the periventricular and subcortical white matter as well as right basal ganglia with diagnostic considerations including but not limited to chronic small vessel ischemia and vasculitis. Chest x-ray without acute process.  When evaluated at the end of March, patient had normal sed rates, similar head CT read. On chart review, patient was discharged from [facility] at the end of March after being treated for an acute CVA. At that time, he was discharged with aspirin and blood pressure medication adjusted. At that time, left-sided visual changes likely associated with CVA, he did have ophthalmology evaluation. He was also evaluated by neurology at [facility] had an MRI of face, neck, brain as well as MRA of head. I do not believe that further emergent imaging needed at this time as it has been no new symptoms. On reexamination, patient is feeling improved, blood pressure with systolic in the 792Y. I discussed the patient the need for close pathology, neurology and cardiology follow-up. I do not believe that further emergent workup indicated today, I do not see anything on the workup or exam that necessitating admission. He has not been taking any of his blood pressure medications are aspirin as an outpatient and we discussed the importance of this. Case management is consultated to ensure that he can fill these medications and make his follow-up appointments. He is able to fill patient's prescriptions when he is to ED. The patient and/or the family were informed of the results of any tests/labs/imaging, the treatment plan, and time was allotted to answer questions. See chart for details of medications given during the ED stay. Clinical  IMPRESSION    1. Hypertension, unspecified type    2. Nonintractable headache, unspecified chronicity pattern, unspecified headache type    3. Chest pain, unspecified type    4. Chronic kidney disease, unspecified CKD stage          Diagnosis and plan discussed in detail with patient who understands and agrees. Patient agrees to return emergency department if symptoms worsen or any new symptoms develop.       Comment: Please note this report has been produced using speech recognition software and may contain errors related to that system including errors in grammar, punctuation, and spelling, as well as words and phrases that may be inappropriate. If there are any questions or concerns please feel free to contact the dictating provider for clarification.         YOVANI Mchugh  04/12/19 5954

## 2019-04-18 LAB
EKG ATRIAL RATE: 67 BPM
EKG DIAGNOSIS: NORMAL
EKG P AXIS: 66 DEGREES
EKG P-R INTERVAL: 162 MS
EKG Q-T INTERVAL: 390 MS
EKG QRS DURATION: 88 MS
EKG QTC CALCULATION (BAZETT): 412 MS
EKG R AXIS: 61 DEGREES
EKG T AXIS: 67 DEGREES
EKG VENTRICULAR RATE: 67 BPM

## 2019-05-08 LAB
ALBUMIN SERPL-MCNC: 4.2 G/DL
ALP BLD-CCNC: 123 U/L
ALT SERPL-CCNC: 9 U/L
ANION GAP SERPL CALCULATED.3IONS-SCNC: NORMAL MMOL/L
AST SERPL-CCNC: 12 U/L
AVERAGE GLUCOSE: NORMAL
BASOPHILS ABSOLUTE: 0.1 /ΜL
BASOPHILS RELATIVE PERCENT: 0.8 %
BILIRUB SERPL-MCNC: 0.4 MG/DL (ref 0.1–1.4)
BUN BLDV-MCNC: 31 MG/DL
CALCIUM SERPL-MCNC: 9.3 MG/DL
CHLORIDE BLD-SCNC: 102 MMOL/L
CHOLESTEROL, TOTAL: 111 MG/DL
CHOLESTEROL/HDL RATIO: NORMAL
CO2: 24 MMOL/L
CREAT SERPL-MCNC: 2.3 MG/DL
EOSINOPHILS ABSOLUTE: 0.1 /ΜL
EOSINOPHILS RELATIVE PERCENT: 1 %
GFR CALCULATED: NORMAL
GLUCOSE BLD-MCNC: 99 MG/DL
HBA1C MFR BLD: 5.2 %
HCT VFR BLD CALC: 33.8 % (ref 41–53)
HDLC SERPL-MCNC: 45 MG/DL (ref 35–70)
HEMOGLOBIN: 11.1 G/DL (ref 13.5–17.5)
LDL CHOLESTEROL CALCULATED: 52 MG/DL (ref 0–160)
LYMPHOCYTES ABSOLUTE: 3.2 /ΜL
LYMPHOCYTES RELATIVE PERCENT: 25.7 %
MCH RBC QN AUTO: ABNORMAL PG
MCHC RBC AUTO-ENTMCNC: ABNORMAL G/DL
MCV RBC AUTO: 94.4 FL
MONOCYTES ABSOLUTE: 1 /ΜL
MONOCYTES RELATIVE PERCENT: 8.3 %
NEUTROPHILS ABSOLUTE: 8 /ΜL
NEUTROPHILS RELATIVE PERCENT: 64.2 %
PLATELET # BLD: 197 K/ΜL
PMV BLD AUTO: ABNORMAL FL
POTASSIUM SERPL-SCNC: 4.6 MMOL/L
RBC # BLD: 3.58 10^6/ΜL
SODIUM BLD-SCNC: 139 MMOL/L
TOTAL PROTEIN: NORMAL
TRIGL SERPL-MCNC: 71 MG/DL
TSH SERPL DL<=0.05 MIU/L-ACNC: 0.57 UIU/ML
VLDLC SERPL CALC-MCNC: 14 MG/DL
WBC # BLD: 12.4 10^3/ML

## 2019-05-16 ENCOUNTER — APPOINTMENT (OUTPATIENT)
Dept: CT IMAGING | Age: 54
End: 2019-05-16
Payer: MEDICARE

## 2019-05-16 ENCOUNTER — TELEPHONE (OUTPATIENT)
Dept: CARDIOLOGY CLINIC | Age: 54
End: 2019-05-16

## 2019-05-16 ENCOUNTER — HOSPITAL ENCOUNTER (EMERGENCY)
Age: 54
Discharge: HOME | End: 2019-05-16
Attending: EMERGENCY MEDICINE
Payer: MEDICARE

## 2019-05-16 VITALS
SYSTOLIC BLOOD PRESSURE: 148 MMHG | TEMPERATURE: 98.2 F | RESPIRATION RATE: 18 BRPM | WEIGHT: 165 LBS | DIASTOLIC BLOOD PRESSURE: 91 MMHG | BODY MASS INDEX: 25.9 KG/M2 | OXYGEN SATURATION: 98 % | HEIGHT: 67 IN | HEART RATE: 81 BPM

## 2019-05-16 DIAGNOSIS — R51.9 ACUTE NONINTRACTABLE HEADACHE, UNSPECIFIED HEADACHE TYPE: Primary | ICD-10-CM

## 2019-05-16 LAB
ALBUMIN SERPL-MCNC: 3.8 GM/DL (ref 3.4–5)
ALP BLD-CCNC: 104 IU/L (ref 40–128)
ALT SERPL-CCNC: 26 U/L (ref 10–40)
ANION GAP SERPL CALCULATED.3IONS-SCNC: 13 MMOL/L (ref 4–16)
AST SERPL-CCNC: 12 IU/L (ref 15–37)
BILIRUB SERPL-MCNC: 0.4 MG/DL (ref 0–1)
BUN BLDV-MCNC: 28 MG/DL (ref 6–23)
CALCIUM SERPL-MCNC: 8.8 MG/DL (ref 8.3–10.6)
CHLORIDE BLD-SCNC: 99 MMOL/L (ref 99–110)
CO2: 24 MMOL/L (ref 21–32)
CREAT SERPL-MCNC: 2.2 MG/DL (ref 0.9–1.3)
GFR AFRICAN AMERICAN: 38 ML/MIN/1.73M2
GFR NON-AFRICAN AMERICAN: 31 ML/MIN/1.73M2
GLUCOSE BLD-MCNC: 97 MG/DL (ref 70–99)
POTASSIUM SERPL-SCNC: 4.4 MMOL/L (ref 3.5–5.1)
SODIUM BLD-SCNC: 136 MMOL/L (ref 135–145)
TOTAL PROTEIN: 6.6 GM/DL (ref 6.4–8.2)

## 2019-05-16 PROCEDURE — 99284 EMERGENCY DEPT VISIT MOD MDM: CPT

## 2019-05-16 PROCEDURE — 96374 THER/PROPH/DIAG INJ IV PUSH: CPT

## 2019-05-16 PROCEDURE — 93005 ELECTROCARDIOGRAM TRACING: CPT | Performed by: EMERGENCY MEDICINE

## 2019-05-16 PROCEDURE — 96375 TX/PRO/DX INJ NEW DRUG ADDON: CPT

## 2019-05-16 PROCEDURE — 6360000002 HC RX W HCPCS: Performed by: EMERGENCY MEDICINE

## 2019-05-16 PROCEDURE — 70450 CT HEAD/BRAIN W/O DYE: CPT

## 2019-05-16 PROCEDURE — 80053 COMPREHEN METABOLIC PANEL: CPT

## 2019-05-16 PROCEDURE — 36415 COLL VENOUS BLD VENIPUNCTURE: CPT

## 2019-05-16 PROCEDURE — 6370000000 HC RX 637 (ALT 250 FOR IP): Performed by: EMERGENCY MEDICINE

## 2019-05-16 RX ORDER — ACETAMINOPHEN 80 MG
TABLET,CHEWABLE ORAL
Status: DISCONTINUED
Start: 2019-05-16 | End: 2019-05-17 | Stop reason: HOSPADM

## 2019-05-16 RX ORDER — METOCLOPRAMIDE HYDROCHLORIDE 5 MG/ML
10 INJECTION INTRAMUSCULAR; INTRAVENOUS ONCE
Status: COMPLETED | OUTPATIENT
Start: 2019-05-16 | End: 2019-05-16

## 2019-05-16 RX ORDER — ACETAMINOPHEN 325 MG/1
650 TABLET ORAL
Status: COMPLETED | OUTPATIENT
Start: 2019-05-16 | End: 2019-05-16

## 2019-05-16 RX ORDER — DIPHENHYDRAMINE HYDROCHLORIDE 50 MG/ML
50 INJECTION INTRAMUSCULAR; INTRAVENOUS ONCE
Status: COMPLETED | OUTPATIENT
Start: 2019-05-16 | End: 2019-05-16

## 2019-05-16 RX ORDER — HYDRALAZINE HYDROCHLORIDE 25 MG/1
25 TABLET, FILM COATED ORAL ONCE
Status: COMPLETED | OUTPATIENT
Start: 2019-05-16 | End: 2019-05-16

## 2019-05-16 RX ORDER — METOPROLOL TARTRATE 50 MG/1
50 TABLET, FILM COATED ORAL ONCE
Status: COMPLETED | OUTPATIENT
Start: 2019-05-16 | End: 2019-05-16

## 2019-05-16 RX ORDER — DEXAMETHASONE SODIUM PHOSPHATE 10 MG/ML
10 INJECTION, SOLUTION INTRAMUSCULAR; INTRAVENOUS ONCE
Status: COMPLETED | OUTPATIENT
Start: 2019-05-16 | End: 2019-05-16

## 2019-05-16 RX ADMIN — ACETAMINOPHEN 650 MG: 325 TABLET ORAL at 18:27

## 2019-05-16 RX ADMIN — DEXAMETHASONE SODIUM PHOSPHATE 10 MG: 10 INJECTION, SOLUTION INTRAMUSCULAR; INTRAVENOUS at 18:27

## 2019-05-16 RX ADMIN — METOCLOPRAMIDE 10 MG: 5 INJECTION, SOLUTION INTRAMUSCULAR; INTRAVENOUS at 18:27

## 2019-05-16 RX ADMIN — DIPHENHYDRAMINE HYDROCHLORIDE 50 MG: 50 INJECTION, SOLUTION INTRAMUSCULAR; INTRAVENOUS at 18:27

## 2019-05-16 RX ADMIN — HYDRALAZINE HYDROCHLORIDE 25 MG: 25 TABLET, FILM COATED ORAL at 22:26

## 2019-05-16 RX ADMIN — METOPROLOL TARTRATE 50 MG: 50 TABLET ORAL at 22:26

## 2019-05-16 ASSESSMENT — PAIN SCALES - GENERAL
PAINLEVEL_OUTOF10: 10
PAINLEVEL_OUTOF10: 10

## 2019-05-16 ASSESSMENT — PAIN DESCRIPTION - LOCATION: LOCATION: HEAD

## 2019-05-16 NOTE — ED PROVIDER NOTES
Triage Chief Complaint:   Hypertension    [place]:  Anamika Moseley is a 49 y.o. male that presents  With headache. He reports that his headaches have been starting ever since he had a stroke approximately one month ago. Headache is described as sharp throbbing around his left temporal now constant over the past two days. Denies any focal numbness tingling or weakness. Had lost eyesight from his left eye secondary to hypertension/stroke. He reports that his headaches usually last approximately 30 minutes however after taking Tylenol which usually helps his headache it has not helped. Patient came in because of persistence of symptoms. None of the assistance he has no other complaints. ROS:  General:  No fevers, no chills, no weakness  Eyes:  No recent vison changes, no discharge  ENT:  No sore throat, no nasal congestion, no hearing changes  Cardiovascular:  No chest pain, no palpitations  Respiratory:  No shortness of breath, no cough, no wheezing  Gastrointestinal:  No pain, no nausea, no vomiting, no diarrhea  Musculoskeletal:  No muscle pain, no joint pain  Skin:  No rash, no pruritis, no easy bruising  Neurologic:  No speech problems, +headache, no extremity numbness, no extremity tingling, no extremity weakness  Psychiatric:  No anxiety  Genitourinary:  No dysuria, no hematuria  Endocrine:  No unexpected weight gain, no unexpected weight loss  Extremities:  no edema, no pain    Past Medical History:   Diagnosis Date    Back ache     Hypertension      History reviewed. No pertinent surgical history. History reviewed. No pertinent family history.   Social History     Socioeconomic History    Marital status: Single     Spouse name: Not on file    Number of children: Not on file    Years of education: Not on file    Highest education level: Not on file   Occupational History    Not on file   Social Needs    Financial resource strain: Not on file    Food insecurity:     Worry: Not on file     Inability: Not unspecified headache type      Disposition referral (if applicable):  No follow-up provider specified. Disposition medications (if applicable):  Discharge Medication List as of 5/16/2019 10:48 PM          Comment: Please note this report has been produced using speech recognition software and may contain errors related to that system including errors in grammar, punctuation, and spelling, as well as words and phrases that may be inappropriate. If there are any questions or concerns please feel free to contact the dictating provider for clarification.         Kala Cage MD  05/18/19 1177

## 2019-05-16 NOTE — TELEPHONE ENCOUNTER
Left message for patient requesting a return call to schedule a consult for circulatory & respiratory symptoms per referral from Rosmery Gonsalez

## 2019-05-17 PROCEDURE — 93010 ELECTROCARDIOGRAM REPORT: CPT | Performed by: INTERNAL MEDICINE

## 2019-05-21 LAB
EKG ATRIAL RATE: 78 BPM
EKG DIAGNOSIS: NORMAL
EKG P AXIS: 55 DEGREES
EKG P-R INTERVAL: 140 MS
EKG Q-T INTERVAL: 376 MS
EKG QRS DURATION: 82 MS
EKG QTC CALCULATION (BAZETT): 428 MS
EKG R AXIS: 48 DEGREES
EKG T AXIS: 53 DEGREES
EKG VENTRICULAR RATE: 78 BPM

## 2019-05-27 ENCOUNTER — HOSPITAL ENCOUNTER (EMERGENCY)
Age: 54
Discharge: HOME | End: 2019-05-27
Attending: EMERGENCY MEDICINE
Payer: MEDICARE

## 2019-05-27 VITALS
SYSTOLIC BLOOD PRESSURE: 124 MMHG | BODY MASS INDEX: 25.01 KG/M2 | DIASTOLIC BLOOD PRESSURE: 80 MMHG | TEMPERATURE: 98.3 F | WEIGHT: 165 LBS | HEIGHT: 68 IN | OXYGEN SATURATION: 100 % | RESPIRATION RATE: 17 BRPM | HEART RATE: 74 BPM

## 2019-05-27 DIAGNOSIS — G44.221 CHRONIC TENSION-TYPE HEADACHE, INTRACTABLE: Primary | ICD-10-CM

## 2019-05-27 PROCEDURE — 96375 TX/PRO/DX INJ NEW DRUG ADDON: CPT

## 2019-05-27 PROCEDURE — 6370000000 HC RX 637 (ALT 250 FOR IP): Performed by: EMERGENCY MEDICINE

## 2019-05-27 PROCEDURE — 96374 THER/PROPH/DIAG INJ IV PUSH: CPT

## 2019-05-27 PROCEDURE — 6360000002 HC RX W HCPCS: Performed by: EMERGENCY MEDICINE

## 2019-05-27 PROCEDURE — 99284 EMERGENCY DEPT VISIT MOD MDM: CPT

## 2019-05-27 PROCEDURE — 96372 THER/PROPH/DIAG INJ SC/IM: CPT

## 2019-05-27 RX ORDER — PROCHLORPERAZINE EDISYLATE 5 MG/ML
10 INJECTION INTRAMUSCULAR; INTRAVENOUS ONCE
Status: COMPLETED | OUTPATIENT
Start: 2019-05-27 | End: 2019-05-27

## 2019-05-27 RX ORDER — DIPHENHYDRAMINE HYDROCHLORIDE 50 MG/ML
50 INJECTION INTRAMUSCULAR; INTRAVENOUS ONCE
Status: COMPLETED | OUTPATIENT
Start: 2019-05-27 | End: 2019-05-27

## 2019-05-27 RX ORDER — MAGNESIUM SULFATE 1 G/100ML
1 INJECTION INTRAVENOUS ONCE
Status: COMPLETED | OUTPATIENT
Start: 2019-05-27 | End: 2019-05-27

## 2019-05-27 RX ORDER — SUMATRIPTAN 6 MG/.5ML
6 INJECTION, SOLUTION SUBCUTANEOUS ONCE
Status: COMPLETED | OUTPATIENT
Start: 2019-05-27 | End: 2019-05-27

## 2019-05-27 RX ORDER — DEXAMETHASONE SODIUM PHOSPHATE 10 MG/ML
10 INJECTION, SOLUTION INTRAMUSCULAR; INTRAVENOUS ONCE
Status: COMPLETED | OUTPATIENT
Start: 2019-05-27 | End: 2019-05-27

## 2019-05-27 RX ORDER — HYDRALAZINE HYDROCHLORIDE 25 MG/1
50 TABLET, FILM COATED ORAL ONCE
Status: COMPLETED | OUTPATIENT
Start: 2019-05-27 | End: 2019-05-27

## 2019-05-27 RX ORDER — ACETAMINOPHEN 325 MG/1
650 TABLET ORAL EVERY 6 HOURS PRN
Qty: 35 TABLET | Refills: 0 | Status: SHIPPED | OUTPATIENT
Start: 2019-05-27 | End: 2019-06-15

## 2019-05-27 RX ORDER — AMLODIPINE BESYLATE 5 MG/1
10 TABLET ORAL ONCE
Status: COMPLETED | OUTPATIENT
Start: 2019-05-27 | End: 2019-05-27

## 2019-05-27 RX ADMIN — SUMATRIPTAN SUCCINATE 6 MG: 6 INJECTION SUBCUTANEOUS at 08:52

## 2019-05-27 RX ADMIN — HYDRALAZINE HYDROCHLORIDE 50 MG: 25 TABLET, FILM COATED ORAL at 08:52

## 2019-05-27 RX ADMIN — MAGNESIUM SULFATE HEPTAHYDRATE 1 G: 1 INJECTION, SOLUTION INTRAVENOUS at 10:49

## 2019-05-27 RX ADMIN — DIPHENHYDRAMINE HYDROCHLORIDE 50 MG: 50 INJECTION INTRAMUSCULAR; INTRAVENOUS at 10:46

## 2019-05-27 RX ADMIN — DEXAMETHASONE SODIUM PHOSPHATE 10 MG: 10 INJECTION, SOLUTION INTRAMUSCULAR; INTRAVENOUS at 08:53

## 2019-05-27 RX ADMIN — PROCHLORPERAZINE EDISYLATE 10 MG: 5 INJECTION INTRAMUSCULAR; INTRAVENOUS at 10:46

## 2019-05-27 RX ADMIN — AMLODIPINE BESYLATE 10 MG: 5 TABLET ORAL at 08:52

## 2019-05-27 ASSESSMENT — PAIN SCALES - GENERAL: PAINLEVEL_OUTOF10: 8

## 2019-05-27 ASSESSMENT — PAIN DESCRIPTION - LOCATION: LOCATION: HEAD

## 2019-05-27 NOTE — ED PROVIDER NOTES
Triage Chief Complaint:   Headache (Left temple. )    [place]:  Loren Weinberg is a 49 y.o. male that presents to the emergency Department with left-sided headache. He reports symptoms are similar to his previous headaches in the past. On the left side throbbing. He reports that symptoms gradually worsened. Not having any numbness tingling or weakness. No new vision changes. He reports that he ran out of his Tylenol which was helping his headache. Is to follow-up with a neurologist in 2 weeks. ROS:  General:  No fevers, no chills, no weakness  Eyes:  No recent vison changes, no discharge  ENT:  No sore throat, no nasal congestion, no hearing changes  Cardiovascular:  No chest pain  Respiratory:  No shortness of breath  Gastrointestinal:  No pain, no nausea, no vomiting, no diarrhea  Musculoskeletal:  No muscle pain, no joint pain  Skin:  No rash, no pruritis, no easy bruising  Neurologic:  No speech problems, + headache, no extremity numbness, no extremity tingling, no extremity weakness, no neck pain or stiffness  Psychiatric:  No anxiety  Extremities:  no edema, no pain    Past Medical History:   Diagnosis Date    Back ache     Hypertension      History reviewed. No pertinent surgical history. History reviewed. No pertinent family history.   Social History     Socioeconomic History    Marital status: Single     Spouse name: Not on file    Number of children: Not on file    Years of education: Not on file    Highest education level: Not on file   Occupational History    Not on file   Social Needs    Financial resource strain: Not on file    Food insecurity:     Worry: Not on file     Inability: Not on file    Transportation needs:     Medical: Not on file     Non-medical: Not on file   Tobacco Use    Smoking status: Current Every Day Smoker     Packs/day: 0.25     Types: Cigarettes    Smokeless tobacco: Never Used   Substance and Sexual Activity    Alcohol use: Never     Frequency: Never    Drug use: Never    Sexual activity: Not on file   Lifestyle    Physical activity:     Days per week: Not on file     Minutes per session: Not on file    Stress: Not on file   Relationships    Social connections:     Talks on phone: Not on file     Gets together: Not on file     Attends [employer] service: Not on file     Active member of club or organization: Not on file     Attends meetings of clubs or organizations: Not on file     Relationship status: Not on file    Intimate partner violence:     Fear of current or ex partner: Not on file     Emotionally abused: Not on file     Physically abused: Not on file     Forced sexual activity: Not on file   Other Topics Concern    Not on file   Social History Narrative    Not on file     Current Facility-Administered Medications   Medication Dose Route Frequency Provider Last Rate Last Dose    magnesium sulfate 1 g in dextrose 5% 100 mL IVPB  1 g Intravenous Once Rosalino Desai MD 3.3 mL/hr at 05/27/19 1049 1 g at 05/27/19 1049     Current Outpatient Medications   Medication Sig Dispense Refill    acetaminophen (TYLENOL) 325 MG tablet Take 2 tablets by mouth every 6 hours as needed for Pain 35 tablet 0    metoprolol tartrate (LOPRESSOR) 50 MG tablet Take 1 tablet by mouth 2 times daily 60 tablet 0    hydrALAZINE (APRESOLINE) 25 MG tablet Take 1 tablet by mouth 3 times daily 90 tablet 0    amLODIPine (NORVASC) 10 MG tablet Take 1 tablet by mouth daily 30 tablet 0    aspirin EC 81 MG EC tablet Take 1 tablet by mouth daily 30 tablet 0    ammonium lactate (LAC-HYDRIN) 12 % lotion Apply topically 2 times daily  2    hydrochlorothiazide (HYDRODIURIL) 25 MG tablet Take 25 mg by mouth daily       Allergies   Allergen Reactions    Pcn [Penicillins]      diarrhea       Nursing Notes Reviewed    Physical Exam:  ED Triage Vitals [05/27/19 0812]   Enc Vitals Group      BP (!) 167/104      Pulse 82      Resp 17      Temp 98.3 °F (36.8 °C)      Temp Source Oral      SpO2 100 % Weight 165 lb (74.8 kg)      Height 5' 8\" (1.727 m)      Head Circumference       Peak Flow       Pain Score       Pain Loc       Pain Edu? Excl. in [address]? My pulse ox interpretation is - normal    General appearance:  No acute distress. Skin:  Warm. Dry. Eye:  Extraocular movements intact. PERRL   Ears, nose, mouth and throat:  Oral mucosa moist   Neck:  Trachea midline. Extremity:  No swelling. Normal ROM     Heart:  Regular  Perfusion:  intact  Respiratory: Respirations nonlabored. Abdominal:   Non distended. Neurological:  Alert and oriented times 3.  5 out of 5 motor strength in extremities,             sensation intact to light touch in extremities, cranial nerves grossly intact, visual                fields intact, negative pronator drift, rapid alternating movements intact    I have reviewed and interpreted all of the currently available lab results from this visit (if applicable):  No results found for this visit on 05/27/19. Radiographs (if obtained):  [] The following radiograph was interpreted by myself in the absence of a radiologist:   [] Radiologist's Report Reviewed:  No orders to display         Chart review shows recent radiographs:  Ct Head Wo Contrast    Result Date: 5/16/2019  EXAMINATION: CT OF THE HEAD WITHOUT CONTRAST  5/16/2019 8:23 pm TECHNIQUE: CT of the head was performed without the administration of intravenous contrast. Dose modulation, iterative reconstruction, and/or weight based adjustment of the mA/kV was utilized to reduce the radiation dose to as low as reasonably achievable. COMPARISON: 3/28/2019 HISTORY: ORDERING SYSTEM PROVIDED HISTORY: ct head TECHNOLOGIST PROVIDED HISTORY: Has a \"code stroke\" or \"stroke alert\" been called? ->No Ordering Physician Provided Reason for Exam: complaints of hypertension x two days.  Acuity: Acute Type of Exam: Ongoing Additional signs and symptoms: none Relevant Medical/Surgical History: hx HTN FINDINGS: BRAIN/VENTRICLES: No acute intracranial hemorrhage. No mass effect. No midline shift. Mild prominence of the ventricles and sulci is consistent with atrophy. Moderate periventricular and subcortical white matter hypodensities are nonspecific but likely represent microvascular disease. ORBITS: The visualized portion of the orbits demonstrate no acute abnormality. SINUSES: The visualized paranasal sinuses and mastoid air cells demonstrate no acute abnormality. SOFT TISSUES/SKULL:  No acute abnormality of the visualized skull or soft tissues. No acute intracranial abnormality. MDM:  Patient presenting with a headache. Presentation, history and physical exam do not appear consistent with intracranial hemorrhage or meningitis. Patient's neurologic exam is intact and nonfocal, and there is no evidence of meningeal signs. There is no history of significant head trauma. Patient was given medications here in the emergency department, on reevaluation patient is starting to feel better. I do not feel as though diagnostic testing in the emergency department is indicated based on the patient's presentation, history, and emergency department course, this was discussed with the patient and they understand and agree. Patient was given Imitrex and Decadron. He reports that the Imitrex made his symptoms worse. He was given Compazine and magnesium. He was reassessed and reports improvement of his symptoms. Patient was discharged with Tylenol by mouth      I do not believe a lumbar puncture is warranted at this time. At this point I do believe we can discharge patient, they need to follow-up with their primary care physician and/or neurologist, they understand and agree with the plan, return warnings given. Clinical Impression:  1.  Chronic tension-type headache, intractable      Disposition referral (if applicable):  follow up with your neurologist          Disposition medications (if applicable):  New Prescriptions    ACETAMINOPHEN (TYLENOL) 325 MG TABLET    Take 2 tablets by mouth every 6 hours as needed for Pain       Comment: Please note this report has been produced using speech recognition software and may contain errors related to that system including errors in grammar, punctuation, and spelling, as well as words and phrases that may be inappropriate. If there are any questions or concerns please feel free to contact the dictating provider for clarification.       Joanne Pruitt MD  05/27/19 1492

## 2019-05-27 NOTE — ED TRIAGE NOTES
Patient states he was here on 5/16 with the same thing with a headache in his left temple  From nerve damage in his eye .

## 2019-05-27 NOTE — CARE COORDINATION
Janelle PerfectSermarlon from ED. Pt in need of ride home. Provided ED with # for [facility]. If [facility] cannot transport, try [facility] [employer], [phone].

## 2019-05-27 NOTE — ED NOTES
This nurse went into discharge patient. Pt states, \"I am needing a ride home. [employer] [facility] does not run during the holiday. Pt states, \"Well, I am blind and I will have to walk and I don't see very well. I called the ambulance earlier that is how I got out here. This nurse attempted to contact case management but could not contact case management for the ER. Pt states he does not have friends or family that live around here. This nurse spoke with Dorian Urias, Unit [facility] to see if any private transport companies could take patient home but no transport was available. Unit clerk contacting in house case management.       Ida Simon, CARLOZ  05/27/19 1790

## 2019-05-27 NOTE — ED NOTES
[facility] called [phone] and arranged for patient per this nurse and approved per Gabrielle Sanderson in Case management. [facility] informed this nurse that they have two other clients ahead of him and it may be 2-3 hours before they can pick him up. This nurse notified patient and patient will be waiting in lobby.       Shant Cannon RN  05/27/19 4307

## 2019-06-15 ENCOUNTER — HOSPITAL ENCOUNTER (EMERGENCY)
Age: 54
Discharge: HOME | End: 2019-06-15
Payer: MEDICARE

## 2019-06-15 VITALS
HEART RATE: 106 BPM | TEMPERATURE: 99.6 F | RESPIRATION RATE: 15 BRPM | HEIGHT: 67 IN | BODY MASS INDEX: 26.21 KG/M2 | OXYGEN SATURATION: 99 % | WEIGHT: 167 LBS | SYSTOLIC BLOOD PRESSURE: 160 MMHG | DIASTOLIC BLOOD PRESSURE: 104 MMHG

## 2019-06-15 DIAGNOSIS — K04.7 DENTAL ABSCESS: Primary | ICD-10-CM

## 2019-06-15 PROBLEM — R51.9 ACUTE NONINTRACTABLE HEADACHE: Status: ACTIVE | Noted: 2019-06-15

## 2019-06-15 PROCEDURE — 99282 EMERGENCY DEPT VISIT SF MDM: CPT

## 2019-06-15 PROCEDURE — 6370000000 HC RX 637 (ALT 250 FOR IP): Performed by: PHYSICIAN ASSISTANT

## 2019-06-15 RX ORDER — ACETAMINOPHEN 500 MG
1000 TABLET ORAL ONCE
Status: COMPLETED | OUTPATIENT
Start: 2019-06-15 | End: 2019-06-15

## 2019-06-15 RX ORDER — CLINDAMYCIN HYDROCHLORIDE 150 MG/1
450 CAPSULE ORAL ONCE
Status: COMPLETED | OUTPATIENT
Start: 2019-06-15 | End: 2019-06-15

## 2019-06-15 RX ORDER — ACETAMINOPHEN 500 MG
500 TABLET ORAL EVERY 6 HOURS PRN
Qty: 60 TABLET | Refills: 1 | Status: SHIPPED | OUTPATIENT
Start: 2019-06-15

## 2019-06-15 RX ORDER — ACETAMINOPHEN 500 MG
500 TABLET ORAL EVERY 6 HOURS PRN
Qty: 30 TABLET | Refills: 0 | Status: SHIPPED | OUTPATIENT
Start: 2019-06-15 | End: 2019-06-15 | Stop reason: SDUPTHER

## 2019-06-15 RX ORDER — CLINDAMYCIN HYDROCHLORIDE 150 MG/1
450 CAPSULE ORAL 3 TIMES DAILY
Qty: 63 CAPSULE | Refills: 0 | Status: SHIPPED | OUTPATIENT
Start: 2019-06-15 | End: 2019-06-22

## 2019-06-15 RX ADMIN — CLINDAMYCIN HYDROCHLORIDE 450 MG: 150 CAPSULE ORAL at 07:59

## 2019-06-15 RX ADMIN — ACETAMINOPHEN 1000 MG: 500 TABLET ORAL at 07:58

## 2019-06-15 ASSESSMENT — PAIN SCALES - GENERAL
PAINLEVEL_OUTOF10: 10
PAINLEVEL_OUTOF10: 10

## 2019-06-15 ASSESSMENT — PAIN DESCRIPTION - LOCATION: LOCATION: TEETH

## 2019-06-15 ASSESSMENT — PAIN DESCRIPTION - PAIN TYPE: TYPE: ACUTE PAIN

## 2019-06-15 NOTE — ED NOTES
Discharge instructions reviewed with pt and questions addressed at this time. Pt alert and oriented x 4 at time of discharge, no signs of acute distress noted. Pt ambulatory to Emergency Department waiting room and steady gait noted.         Damon Jean RN  06/15/19 1199

## 2019-06-15 NOTE — ED NOTES
Bed: ED-19  Expected date:   Expected time:   Means of arrival:   Comments:  EMS     Sherri Trinh RN  06/15/19 8896

## 2019-06-15 NOTE — ED PROVIDER NOTES
eMERGENCY dEPARTMENT eNCOUnter      [facility]    Chief Complaint   Patient presents with    Dental Pain     R sided         HPI    Myron Joseph is a 49 y.o. male who presents with dental pain. Onset was 3 weeks ago. Context was spontaneous. Localized to the right lower area of the mouth. Constant since onset. Pain is characterized as sharp. Worse with chewing, alleviated with nothing. Has not tried anything at home. Patient reports radiation to nowhere. Severity is a 10 on a scale of 0-10. Patient denies any fever, chest pain, sob, nausea, vomiting. REVIEW OF SYSTEMS    Constitutional:  Denies fever, chills. HENT:  Denies neck pain, ear pain. Dentition:  + dental pain  Respiratory:  Denies any shortness of breath, cough. Cardiovascular:  Denies chest pain, syncope. GI:  Denies nausea, vomiting. PAST MEDICAL & SURGICAL HISTORY    Past Medical History:   Diagnosis Date    Back ache     Hypertension      History reviewed. No pertinent surgical history.       CURRENT MEDICATIONS    Current Outpatient Rx   Medication Sig Dispense Refill    clindamycin (CLEOCIN) 150 MG capsule Take 3 capsules by mouth 3 times daily for 7 days 63 capsule 0    acetaminophen (TYLENOL) 500 MG tablet Take 1 tablet by mouth every 6 hours as needed for Pain 60 tablet 1    Cholecalciferol (VITAMIN D3) 1000 units CAPS Take 1 capsule by mouth daily      SUMAtriptan (IMITREX) 100 MG tablet Take 100 mg by mouth once as needed for Migraine      metoprolol tartrate (LOPRESSOR) 50 MG tablet Take 1 tablet by mouth daily 60 tablet 0    spironolactone (ALDACTONE) 25 MG tablet Take 0.5 tablets by mouth daily 45 tablet 1    hydrALAZINE (APRESOLINE) 25 MG tablet Take 1 tablet by mouth 3 times daily 90 tablet 0    amLODIPine (NORVASC) 10 MG tablet Take 1 tablet by mouth daily 30 tablet 0    aspirin EC 81 MG EC tablet Take 1 tablet by mouth daily 30 tablet 0    ammonium lactate (LAC-HYDRIN) 12 % lotion Apply topically 2 times daily  2         ALLERGIES    Allergies   Allergen Reactions    Pcn [Penicillins]      diarrhea         SOCIAL & FAMILY HISTORY    Social History     Socioeconomic History    Marital status: Single     Spouse name: None    Number of children: None    Years of education: None    Highest education level: None   Occupational History    None   Social Needs    Financial resource strain: None    Food insecurity:     Worry: None     Inability: None    Transportation needs:     Medical: None     Non-medical: None   Tobacco Use    Smoking status: Current Every Day Smoker     Packs/day: 0.25     Types: Cigarettes    Smokeless tobacco: Never Used   Substance and Sexual Activity    Alcohol use: Never     Frequency: Never    Drug use: Never    Sexual activity: None   Lifestyle    Physical activity:     Days per week: None     Minutes per session: None    Stress: None   Relationships    Social connections:     Talks on phone: None     Gets together: None     Attends Bahai service: None     Active member of club or organization: None     Attends meetings of clubs or organizations: None     Relationship status: None    Intimate partner violence:     Fear of current or ex partner: None     Emotionally abused: None     Physically abused: None     Forced sexual activity: None   Other Topics Concern    None   Social History Narrative    None     History reviewed. No pertinent family history. PHYSICAL EXAM    VITAL SIGNS: BP (!) 160/104   Pulse 106   Temp 99.6 °F (37.6 °C) (Oral)   Resp 15   Ht 5' 7\" (1.702 m)   Wt 167 lb (75.8 kg)   SpO2 99%   BMI 26.16 kg/m²    Constitutional:  Well nourished, no acute distress   HENT:  NC/AT. Ears, nose normal.  Oropharynx moist, uvula midline, no trismus. Tolerate saliva well. Dentition: Overall dentition is of moderate quality. There is tenderness to palpation of the right lower first molar without obvious erythema.   There is some slight swelling of the right lower jaw but no mauricio abscess. No sublingual tenderness to palpation or swelling noted. Neck:  Supple, no swelling. Respiratory:  Normal respiratory effort. Cardiovascular:  RRR. Integument:  Well hydrated, no rashes. Neurologic:  Alert & oriented x 3. No focal deficits. PROCEDURES      ED COURSE & MEDICAL DECISION MAKING    Pertinent Labs & Imaging studies reviewed and interpreted. (See chart for details)  -  Patient seen and evaluated in the emergency department. -  Triage and nursing notes reviewed and incorporated. -  Old chart records reviewed and incorporated. -  I have independently evaluated this patient. -  Differential diagnosis includes: dental caries, dental abscess, Lamar's Angina, retropharyngeal abscess, bacterial meningitis, airway obstruction, and others  -  Work-up included:  -  Patient treated with Tylenol, Clinda in the ED.  -Patient presents for dental pain and jaw swelling. No mauricio abscess noted with indication for incision and drainage but I do clinically suspect an apical abscess. Patient is allergic to penicillin, will start on clindamycin, refer to dentist.  Discussed with him that I will not be able to correct the underlying problem and it is vitally important that he follows up with dentist.  Patient discharged home. I estimate there is LOW risk for DEEP SPACE INFECTION (e.g., LAMARS ANGINA OR RETROPHARYNGEAL ABSCESS), BACTERIAL MENINGITIS, or AIRWAY COMPROMISE, thus I consider the discharge disposition reasonable. Discharge prescriptions for clinda, tylenol provided and patient instructed in their use. Follow-up with dentist in 2-3 days. Return to the Emergency Department immediately if new or worsening symptoms occur.   We have discussed the symptoms which are most concerning that necessitate immediate return, including worsening pain, difficulty swallowing, fever, chills, difficulty breathing, etc.  Patient agreeable with plan of care

## 2019-06-18 ENCOUNTER — HOSPITAL ENCOUNTER (EMERGENCY)
Age: 54
Discharge: HOME | End: 2019-06-18
Payer: MEDICARE

## 2019-06-18 VITALS
TEMPERATURE: 98.2 F | SYSTOLIC BLOOD PRESSURE: 140 MMHG | OXYGEN SATURATION: 98 % | HEART RATE: 80 BPM | BODY MASS INDEX: 26.21 KG/M2 | DIASTOLIC BLOOD PRESSURE: 88 MMHG | WEIGHT: 167 LBS | HEIGHT: 67 IN | RESPIRATION RATE: 16 BRPM

## 2019-06-18 DIAGNOSIS — R51.9 CHRONIC NONINTRACTABLE HEADACHE, UNSPECIFIED HEADACHE TYPE: ICD-10-CM

## 2019-06-18 DIAGNOSIS — K04.7 DENTAL ABSCESS: Primary | ICD-10-CM

## 2019-06-18 DIAGNOSIS — G89.29 CHRONIC NONINTRACTABLE HEADACHE, UNSPECIFIED HEADACHE TYPE: ICD-10-CM

## 2019-06-18 PROCEDURE — 6370000000 HC RX 637 (ALT 250 FOR IP): Performed by: PHYSICIAN ASSISTANT

## 2019-06-18 PROCEDURE — 96375 TX/PRO/DX INJ NEW DRUG ADDON: CPT

## 2019-06-18 PROCEDURE — 96374 THER/PROPH/DIAG INJ IV PUSH: CPT

## 2019-06-18 PROCEDURE — 99283 EMERGENCY DEPT VISIT LOW MDM: CPT

## 2019-06-18 PROCEDURE — 2580000003 HC RX 258: Performed by: PHYSICIAN ASSISTANT

## 2019-06-18 PROCEDURE — 6360000002 HC RX W HCPCS: Performed by: PHYSICIAN ASSISTANT

## 2019-06-18 PROCEDURE — 96361 HYDRATE IV INFUSION ADD-ON: CPT

## 2019-06-18 RX ORDER — 0.9 % SODIUM CHLORIDE 0.9 %
1000 INTRAVENOUS SOLUTION INTRAVENOUS ONCE
Status: COMPLETED | OUTPATIENT
Start: 2019-06-18 | End: 2019-06-18

## 2019-06-18 RX ORDER — HYDROCODONE BITARTRATE AND ACETAMINOPHEN 5; 325 MG/1; MG/1
1 TABLET ORAL ONCE
Status: COMPLETED | OUTPATIENT
Start: 2019-06-18 | End: 2019-06-18

## 2019-06-18 RX ORDER — TOPIRAMATE 25 MG/1
25 TABLET ORAL 2 TIMES DAILY
Qty: 60 TABLET | Refills: 0 | Status: SHIPPED | OUTPATIENT
Start: 2019-06-18

## 2019-06-18 RX ORDER — METOCLOPRAMIDE HYDROCHLORIDE 5 MG/ML
10 INJECTION INTRAMUSCULAR; INTRAVENOUS ONCE
Status: COMPLETED | OUTPATIENT
Start: 2019-06-18 | End: 2019-06-18

## 2019-06-18 RX ORDER — DIPHENHYDRAMINE HYDROCHLORIDE 50 MG/ML
50 INJECTION INTRAMUSCULAR; INTRAVENOUS ONCE
Status: COMPLETED | OUTPATIENT
Start: 2019-06-18 | End: 2019-06-18

## 2019-06-18 RX ADMIN — METOCLOPRAMIDE 10 MG: 5 INJECTION, SOLUTION INTRAMUSCULAR; INTRAVENOUS at 22:14

## 2019-06-18 RX ADMIN — HYDROCODONE BITARTRATE AND ACETAMINOPHEN 1 TABLET: 5; 325 TABLET ORAL at 22:14

## 2019-06-18 RX ADMIN — DIPHENHYDRAMINE HYDROCHLORIDE 50 MG: 50 INJECTION, SOLUTION INTRAMUSCULAR; INTRAVENOUS at 22:14

## 2019-06-18 RX ADMIN — SODIUM CHLORIDE 1000 ML: 9 INJECTION, SOLUTION INTRAVENOUS at 22:14

## 2019-06-18 ASSESSMENT — PAIN DESCRIPTION - LOCATION: LOCATION: HEAD;EYE

## 2019-06-18 ASSESSMENT — PAIN SCALES - GENERAL
PAINLEVEL_OUTOF10: 10
PAINLEVEL_OUTOF10: 10

## 2019-06-18 ASSESSMENT — PAIN DESCRIPTION - ORIENTATION: ORIENTATION: LEFT

## 2019-06-18 ASSESSMENT — PAIN DESCRIPTION - PAIN TYPE: TYPE: CHRONIC PAIN;ACUTE PAIN

## 2019-06-18 NOTE — ED TRIAGE NOTES
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 Pt presents today with c/o L eye, L temple pain.  Pt also reports L lower dental pain

## 2019-06-19 NOTE — ED PROVIDER NOTES
pruritis  Neurologic:  Denies focal weakness, or sensory changes. + GONZALEZ    See HPI and nursing notes for additional information     I have reviewed the following nursing documentation:  Allergies: Allergies   Allergen Reactions    Pcn [Penicillins]      diarrhea       Past medical history:  has a past medical history of Back ache and Hypertension. Past surgical history:  has no past surgical history on file. Home medications:   Prior to Admission medications    Medication Sig Start Date End Date Taking? Authorizing Provider   topiramate (TOPAMAX) 25 MG tablet Take 1 tablet by mouth 2 times daily 6/18/19  Yes Janusz Albarado PA-C   clindamycin (CLEOCIN) 150 MG capsule Take 3 capsules by mouth 3 times daily for 7 days 6/15/19 6/22/19  Ardie Schaumann Hemmert, PA-C   acetaminophen (TYLENOL) 500 MG tablet Take 1 tablet by mouth every 6 hours as needed for Pain 6/15/19   Ardie Schaumann Hemmert, PA-C   Cholecalciferol (VITAMIN D3) 1000 units CAPS Take 1 capsule by mouth daily    Historical Provider, MD   SUMAtriptan (IMITREX) 100 MG tablet Take 100 mg by mouth once as needed for Migraine    Historical Provider, MD   metoprolol tartrate (LOPRESSOR) 50 MG tablet Take 1 tablet by mouth daily 6/13/19   Clydene FortisNITIN CNP   spironolactone (ALDACTONE) 25 MG tablet Take 0.5 tablets by mouth daily 6/13/19   Clydene FortNITIN cunha - CNP   hydrALAZINE (APRESOLINE) 25 MG tablet Take 1 tablet by mouth 3 times daily 4/12/19   YOVANI Cruz   amLODIPine (NORVASC) 10 MG tablet Take 1 tablet by mouth daily 4/12/19   YOVANI Cruz   aspirin EC 81 MG EC tablet Take 1 tablet by mouth daily 4/12/19   YOVANI Cruz   ammonium lactate (LAC-HYDRIN) 12 % lotion Apply topically 2 times daily 2/21/19   Historical Provider, MD       Social history:  reports that he has been smoking cigarettes. He has been smoking about 0.25 packs per day.  He has never used smokeless tobacco. He reports that he does not drink alcohol or use drugs. Family history:  History reviewed. No pertinent family history. Exam  BP (!) 140/88   Pulse 80   Temp 98.2 °F (36.8 °C) (Oral)   Resp 16   Ht 5' 7\" (1.702 m)   Wt 167 lb (75.8 kg)   SpO2 98%   BMI 26.16 kg/m²   Nursing note and vitals reviewed. Constitutional: Well developed, well nourished. No acute distress. HENT:      Head: Normocephalic and atraumatic. Ears: External ears normal.      Nose: Nose normal.     Mouth: Membrane mucosa moist and pink. No posterior oropharynx erythema or tonsillar edema. Patient appears to have a draining dental abscess just below the right first lower molar. It is mildly tender to palpation. No sublingual tenderness to palpation or swelling. No trismus. Tolerates saliva well. Eyes: Anicteric sclera. No discharge. R pupil round and reactive to light. L pupil is dilated and fixed without reaction to light, pupil is cloudy. Neck: Supple. Trachea midline. Cardiovascular: RRR, no murmurs, rubs, or gallops, radial pulses 2+ bilaterally. Pulmonary/Chest: Effort normal. No respiratory distress. CTAB. No stridor. No wheezes. No rales. Abdominal: Soft. Nontender to palpation. No distension. No guarding, rebound tenderness, or evidence of ascites. : No CVA tenderness. Musculoskeletal: Moves all extremities. No gross deformity. NEUROLOGICAL: Awake and alert. GCS 15. Cranial nerves 2-12 grossly intact except for left eye which has dilated fixed pupil without reaction to light, no vision in left eye per patient. Strength 5/5 throughout. Light touch sensation intact throughout. Finger to nose WNL. CRISELDA intact bilat. Heel shin intact bilat. DTR's 2+ in all 4 extremities. Skin: Warm and dry. No rash.         Radiographs (if obtained):  [] The following radiograph was interpreted by myself in the absence of a radiologist:   [x] Radiologist's Report Reviewed:  No orders to display          Labs  No results found for this visit on 06/18/19. MDM  Patient presents for chronic headaches similar to previous headaches as well as continued dental pain. He reports dental pain is improving, he does have a draining abscess noted on exam, he has been compliant with antibiotics. Review of records shows that patient has been to ED multiple times for headaches, he is self reporting that these are unchanged from previous, he has seen neurology, states the gabapentin they started him on is not helping. Neuro exam here appears to be at baseline when compared to old records. I did give him a migraine cocktail as well as a Norco and he reports symptoms have almost completely resolved. I did consult Dr. Natty Huggins, patient's neurologist, discussed history and physical exam, recent ED visits, patient concerned that none of his meds are helping. She recommended starting patient on Topamax 25 mg twice daily and reports that she will be able to see him in the office in the next few days for recheck. On reassessment patient reports he is feeling much better, discussed Dr. Yumiko Langley recommendations, he will start taking Topamax. Plan is to discharge. Recommended follow-up with dentist and neurologist within the next few days. Discussed returning to ED for any new or worsening symptoms. Assessment and plan discussed with patient understands and agrees. DDx: INTRACRANIAL HEMORRHAGE,SUBARACHNOID HEMORRHAGE, SUBDURAL HEMATOMA, STROKE, TIA, MENINGITIS, ENCEPHALITIS, SPINAL CORD INJURY/COMPRESSION/LESION, VERTEBROBASILAR INSUFFICIENCY, ANAPHYLAXIS, ESOPHAGEAL FOREIGN BODY, DEEP SPACE INFECTION, EPIGLOTTITIS, PERITONSILLAR ABSCESS, MENINGITIS, AIRWAY COMPROMISE, ANGIOEDEMA    I have independently evaluated this patient. Final Impression  1. Dental abscess    2. Chronic nonintractable headache, unspecified headache type        Blood pressure (!) 140/88, pulse 80, temperature 98.2 °F (36.8 °C), temperature source Oral, resp.  rate 16, height 5' 7\" (1.702 m),

## 2019-06-19 NOTE — ED NOTES
@[id number] called Dr Chris Mendoza office spoke to Frank, will page on call     Lora Ocasio  06/18/19 [id number]

## 2019-06-19 NOTE — ED NOTES
Discharge instructions given with prescription verbalized understanding pt is ambulatory out       Ganesh Villagomez RN  06/18/19 [id number]

## 2019-06-25 ENCOUNTER — INITIAL CONSULT (OUTPATIENT)
Dept: CARDIOLOGY CLINIC | Age: 54
End: 2019-06-25
Payer: MEDICARE

## 2019-06-25 VITALS
HEART RATE: 62 BPM | BODY MASS INDEX: 25.77 KG/M2 | HEIGHT: 67 IN | DIASTOLIC BLOOD PRESSURE: 82 MMHG | SYSTOLIC BLOOD PRESSURE: 138 MMHG | WEIGHT: 164.2 LBS

## 2019-06-25 DIAGNOSIS — I42.0 DILATED CARDIOMYOPATHY (HCC): ICD-10-CM

## 2019-06-25 DIAGNOSIS — R06.02 SOB (SHORTNESS OF BREATH): ICD-10-CM

## 2019-06-25 DIAGNOSIS — N18.30 CHRONIC RENAL FAILURE, STAGE 3 (MODERATE) (HCC): ICD-10-CM

## 2019-06-25 DIAGNOSIS — R07.2 PRECORDIAL PAIN: ICD-10-CM

## 2019-06-25 DIAGNOSIS — I10 MALIGNANT HYPERTENSION: Primary | ICD-10-CM

## 2019-06-25 PROCEDURE — G8419 CALC BMI OUT NRM PARAM NOF/U: HCPCS | Performed by: INTERNAL MEDICINE

## 2019-06-25 PROCEDURE — G8427 DOCREV CUR MEDS BY ELIG CLIN: HCPCS | Performed by: INTERNAL MEDICINE

## 2019-06-25 PROCEDURE — 99213 OFFICE O/P EST LOW 20 MIN: CPT | Performed by: INTERNAL MEDICINE

## 2019-06-25 PROCEDURE — 3017F COLORECTAL CA SCREEN DOC REV: CPT | Performed by: INTERNAL MEDICINE

## 2019-06-25 PROCEDURE — 4004F PT TOBACCO SCREEN RCVD TLK: CPT | Performed by: INTERNAL MEDICINE

## 2019-06-25 RX ORDER — ATORVASTATIN CALCIUM 40 MG/1
40 TABLET, FILM COATED ORAL DAILY
COMMUNITY
End: 2019-06-25 | Stop reason: SDUPTHER

## 2019-06-25 RX ORDER — SPIRONOLACTONE 25 MG/1
12.5 TABLET ORAL DAILY
Qty: 45 TABLET | Refills: 1 | Status: CANCELLED | OUTPATIENT
Start: 2019-06-25

## 2019-06-25 RX ORDER — METOPROLOL TARTRATE 50 MG/1
50 TABLET, FILM COATED ORAL DAILY
Qty: 60 TABLET | Refills: 0 | Status: CANCELLED | OUTPATIENT
Start: 2019-06-25

## 2019-06-25 RX ORDER — AMLODIPINE BESYLATE 10 MG/1
10 TABLET ORAL DAILY
Qty: 30 TABLET | Refills: 0 | Status: CANCELLED | OUTPATIENT
Start: 2019-06-25

## 2019-06-25 RX ORDER — FOLIC ACID 1 MG/1
1 TABLET ORAL DAILY
COMMUNITY

## 2019-06-25 RX ORDER — GABAPENTIN 300 MG/1
300 CAPSULE ORAL 3 TIMES DAILY
COMMUNITY

## 2019-06-25 RX ORDER — HYDRALAZINE HYDROCHLORIDE 25 MG/1
25 TABLET, FILM COATED ORAL 3 TIMES DAILY
Qty: 90 TABLET | Refills: 0 | Status: CANCELLED | OUTPATIENT
Start: 2019-06-25

## 2019-06-25 RX ORDER — ATORVASTATIN CALCIUM 40 MG/1
40 TABLET, FILM COATED ORAL DAILY
Qty: 30 TABLET | Refills: 2 | Status: CANCELLED | OUTPATIENT
Start: 2019-06-25

## 2019-06-25 NOTE — PROGRESS NOTES
OFFICE PROGRESS NOTES      Sosa Zepeda is a 49 y.o. male who has    CHIEF COMPLAINT AS FOLLOWS:  CHEST PAIN: Patient denies any C/O chest pains at this time. SOB: No C/O SOB at this time. .   LEG EDEMA: No leg edema   PALPITATIONS: Denies any C/O Palpitations                                   DIZZINESS: No C/O Dizziness                           SYNCOPE: None   OTHER:                                     HPI: Patient is here for F/U on his  HTN & CMP problems. He does not have any complaints at this time. Osmany Jay has the following history recorded in care path:  Patient Active Problem List    Diagnosis Date Noted    Precordial pain 06/25/2019    SOB (shortness of breath) 06/25/2019    Dilated cardiomyopathy ([address] 06/25/2019    Chronic renal failure, stage 3 (moderate) (HCC) 06/25/2019    Acute nonintractable headache 06/15/2019    Hypertensive emergency 03/23/2019    Malignant hypertension      Current Outpatient Medications   Medication Sig Dispense Refill    atorvastatin (LIPITOR) 40 MG tablet Take 40 mg by mouth daily      folic acid (FOLVITE) 1 MG tablet Take 1 mg by mouth daily      gabapentin (NEURONTIN) 300 MG capsule Take 300 mg by mouth 3 times daily.       topiramate (TOPAMAX) 25 MG tablet Take 1 tablet by mouth 2 times daily 60 tablet 0    acetaminophen (TYLENOL) 500 MG tablet Take 1 tablet by mouth every 6 hours as needed for Pain 60 tablet 1    Cholecalciferol (VITAMIN D3) 1000 units CAPS Take 1 capsule by mouth daily      SUMAtriptan (IMITREX) 100 MG tablet Take 100 mg by mouth once as needed for Migraine      metoprolol tartrate (LOPRESSOR) 50 MG tablet Take 1 tablet by mouth daily 60 tablet 0    spironolactone (ALDACTONE) 25 MG tablet Take 0.5 tablets by mouth daily 45 tablet 1    hydrALAZINE (APRESOLINE) 25 MG tablet Take 1 tablet by mouth 3 times daily 90 tablet 0    amLODIPine (NORVASC) 10 MG tablet Take 1 tablet by mouth daily 30 tablet 0    aspirin EC 81 MG EC tablet Take 1 tablet by mouth daily 30 tablet 0    ammonium lactate (LAC-HYDRIN) 12 % lotion Apply topically 2 times daily  2     No current facility-administered medications for this visit. Allergies: Pcn [penicillins]  Past Medical History:   Diagnosis Date    Back ache     Hypertension      History reviewed. No pertinent surgical history. As reviewed History reviewed. No pertinent family history. Social History     Tobacco Use    Smoking status: Current Every Day Smoker     Packs/day: 0.25     Types: Cigarettes    Smokeless tobacco: Never Used   Substance Use Topics    Alcohol use: Never     Frequency: Never      Review of Systems:    Constitutional: Negative for diaphoresis and fatigue  Psychological:Negative for anxiety or depression  HENT: Negative for headaches, nasal congestion, sinus pain or vertigo  Eyes: Negative for visual disturbance. Endocrine: Negative for polydipsia/polyuria  Respiratory: Negative for shortness of breath  Cardiovascular: Negative for chest pain, dyspnea on exertion, claudication, edema, irregular heartbeat, murmur, palpitations or shortness of breath  Gastrointestinal: Negative for abdominal pain or heartburn  Genito-Urinary: Negative for urinary frequency/urgency  Musculoskeletal: Negative for muscle pain, muscular weakness, negative for pain in arm and leg or swelling in foot and leg  Neurological: Negative for dizziness, headaches, memory loss, numbness/tingling, visual changes, syncope  Dermatological: Negative for rash    Objective:  /82   Pulse 62   Ht 5' 7\" (1.702 m)   Wt 164 lb 3.2 oz (74.5 kg)   BMI 25.72 kg/m²   Wt Readings from Last 3 Encounters:   06/25/19 164 lb 3.2 oz (74.5 kg)   06/18/19 167 lb (75.8 kg)   06/15/19 167 lb (75.8 kg)     Body mass index is 25.72 kg/m². GENERAL - Alert, oriented, pleasant, in no apparent distress. EYES: No jaundice, no conjunctival pallor.   SKIN: It is warm & dry. No rashes. No Echhymosis    HEENT - No clinically significant abnormalities seen. Neck - Supple. No jugular venous distention noted. No carotid bruits. Cardiovascular - Normal S1 and S2 without obvious murmur or gallop. Extremities - No cyanosis, clubbing, or significant edema. Pulmonary - No respiratory distress. No wheezes or rales. Abdomen - No masses, tenderness, or organomegaly. Musculoskeletal - No significant edema. No joint deformities. No muscle wasting. Neurologic - Cranial nerves II through XII are grossly intact. There were no gross focal neurologic abnormalities.     Lab Review   Lab Results   Component Value Date    CKTOTAL 48 03/28/2019    TROPONINT 0.023 04/12/2019    TROPONINT 0.106 03/28/2019     BNP:  No results found for: BNP  PT/INR:    Lab Results   Component Value Date    INR 1.00 03/28/2019    INR 0.98 03/26/2019     Lab Results   Component Value Date    LABA1C 5.2 05/08/2019     Lab Results   Component Value Date    WBC 12.4 05/08/2019    WBC 12.0 (H) 04/12/2019    HCT 33.8 (A) 05/08/2019    HCT 43.9 04/12/2019    MCV 94.4 05/08/2019    MCV 94.2 04/12/2019     05/08/2019     04/12/2019     Lab Results   Component Value Date    CHOL 111 05/08/2019    TRIG 71 05/08/2019    HDL 45 05/08/2019    LDLCALC 52 05/08/2019     Lab Results   Component Value Date    ALT 26 05/16/2019    ALT 9 05/08/2019    AST 12 (L) 05/16/2019    AST 12 05/08/2019     BMP:    Lab Results   Component Value Date     06/13/2019     05/16/2019    K 4.9 06/13/2019    K 4.4 05/16/2019     06/13/2019    CL 99 05/16/2019    CO2 21 06/13/2019    CO2 24 05/16/2019    BUN 26 06/13/2019    BUN 28 05/16/2019    CREATININE 1.9 06/13/2019    CREATININE 2.2 05/16/2019     CMP:   Lab Results   Component Value Date     06/13/2019     05/16/2019    K 4.9 06/13/2019    K 4.4 05/16/2019     06/13/2019    CL 99 05/16/2019    CO2 21 06/13/2019    CO2 24 05/16/2019    BUN 26 06/13/2019    BUN 28 05/16/2019    PROT 6.6 05/16/2019    PROT 7.6 04/12/2019     TSH:    Lab Results   Component Value Date    TSH 0.572 05/08/2019       QUALITY MEASURES REVIEWED:  1.CAD:Patient is taking anti platelet agent:Yes  2. DYSLIPIDEMIA: Patient is on cholesterol lowering medication:Yes  3. Beta-Blocker therapy for CAD, if prior Myocardial Infarction:Yes  4. Atrial fibrillation & anticoagulation therapy No      Impression:    1. Malignant hypertension    2. Precordial pain    3. SOB (shortness of breath)    4. Dilated cardiomyopathy ([facility] [address]    5. Chronic renal failure, stage 3 (moderate) (HCC)       Patient Active Problem List   Diagnosis Code    Hypertensive emergency I16.1    Malignant hypertension I10    Acute nonintractable headache R51    Precordial pain R07.2    SOB (shortness of breath) R06.02    Dilated cardiomyopathy (HCC) I42.0    Chronic renal failure, stage 3 (moderate) (HCC) N18.3       Assessment & Plan:    CAD:No. Chest pain is atypical. Cardiolite  HTN:well controlled on current medical regimen, see list above. - changes in  treatment:   no   CARDIOMYOPATHY:  Known. EF 40 TO 45 % BY eCHO   CONGESTIVE HEART FAILURE: NO KNOWN HISTORY.      VHD: No significant VHD noted  OTHER RELEVANT DIAGNOSIS:as noted in patient's active problem list:  TESTS ORDERED: None this visit                                              All previously ordered tests reviewed. ARRHYTHMIAS: None known   MEDICATIONS: CPM   Office f/u in six months. Primary/secondary prevention is the goal by aggressive risk modification, healthy and therapeutic life style changes for cardiovascular risk reduction. Various goals are discussed and multiple questions answered.

## 2019-06-25 NOTE — PATIENT INSTRUCTIONS
CAD: No. Chest pain is atypical. Cardiolite  HTN:well controlled on current medical regimen, see list above. - changes in  treatment:   no   CARDIOMYOPATHY:  Known. EF 40 TO 45 % BY eCHO   CONGESTIVE HEART FAILURE: NO KNOWN HISTORY.      VHD: No significant VHD noted  OTHER RELEVANT DIAGNOSIS:as noted in patient's active problem list:  TESTS ORDERED: None this visit                                              All previously ordered tests reviewed. ARRHYTHMIAS: None known   MEDICATIONS: CPM   Office f/u in six months. Primary/secondary prevention is the goal by aggressive risk modification, healthy and therapeutic life style changes for cardiovascular risk reduction. Various goals are discussed and multiple questions answered.

## 2019-06-25 NOTE — LETTER
[facility]  [address] [place] [address]  Phone: [phone]  Fax: [phone]    Archana Juarez MD        June 25, 2019     NITIN Mcleod NP 82    Patient: Ora Darby  MR Number: [id number]  YOB: 1965  Date of Visit: 6/25/2019    Dear Dr. Rosmery Quevedo:    Thank you for the request for consultation for Edwin Neil to me for the evaluation of HTN. Below are the relevant portions of my assessment and plan of care. If you have questions, please do not hesitate to call me. I look forward to following Edwin along with you.     Sincerely,        Archana Jaurez MD

## 2019-06-27 RX ORDER — ATORVASTATIN CALCIUM 40 MG/1
40 TABLET, FILM COATED ORAL DAILY
Qty: 30 TABLET | Refills: 3 | Status: SHIPPED | OUTPATIENT
Start: 2019-06-27

## 2019-06-27 RX ORDER — HYDRALAZINE HYDROCHLORIDE 25 MG/1
25 TABLET, FILM COATED ORAL 3 TIMES DAILY
Qty: 90 TABLET | Refills: 0 | Status: SHIPPED | OUTPATIENT
Start: 2019-06-27 | End: 2019-12-18 | Stop reason: SDUPTHER

## 2019-06-27 RX ORDER — METOPROLOL TARTRATE 50 MG/1
50 TABLET, FILM COATED ORAL DAILY
Qty: 60 TABLET | Refills: 0 | Status: SHIPPED | OUTPATIENT
Start: 2019-06-27 | End: 2019-08-31 | Stop reason: SDUPTHER

## 2019-06-27 RX ORDER — AMLODIPINE BESYLATE 10 MG/1
10 TABLET ORAL DAILY
Qty: 30 TABLET | Refills: 0 | Status: SHIPPED | OUTPATIENT
Start: 2019-06-27 | End: 2019-12-18 | Stop reason: SDUPTHER

## 2019-06-27 RX ORDER — SPIRONOLACTONE 25 MG/1
12.5 TABLET ORAL DAILY
Qty: 45 TABLET | Refills: 1 | Status: SHIPPED | OUTPATIENT
Start: 2019-06-27 | End: 2019-12-18 | Stop reason: SDUPTHER

## 2019-08-21 ENCOUNTER — HOSPITAL ENCOUNTER (EMERGENCY)
Age: 54
Discharge: HOME | End: 2019-08-21
Payer: MEDICARE

## 2019-08-21 VITALS
HEIGHT: 67 IN | DIASTOLIC BLOOD PRESSURE: 105 MMHG | BODY MASS INDEX: 25.11 KG/M2 | TEMPERATURE: 98.2 F | RESPIRATION RATE: 18 BRPM | WEIGHT: 160 LBS | HEART RATE: 96 BPM | SYSTOLIC BLOOD PRESSURE: 144 MMHG | OXYGEN SATURATION: 100 %

## 2019-08-21 DIAGNOSIS — R51.9 NONINTRACTABLE HEADACHE, UNSPECIFIED CHRONICITY PATTERN, UNSPECIFIED HEADACHE TYPE: Primary | ICD-10-CM

## 2019-08-21 DIAGNOSIS — K08.89 PAIN, DENTAL: ICD-10-CM

## 2019-08-21 PROCEDURE — 6360000002 HC RX W HCPCS: Performed by: PHYSICIAN ASSISTANT

## 2019-08-21 PROCEDURE — 96372 THER/PROPH/DIAG INJ SC/IM: CPT

## 2019-08-21 PROCEDURE — 99282 EMERGENCY DEPT VISIT SF MDM: CPT

## 2019-08-21 RX ORDER — BUTALBITAL, ACETAMINOPHEN AND CAFFEINE 300; 40; 50 MG/1; MG/1; MG/1
1 CAPSULE ORAL EVERY 6 HOURS PRN
Qty: 10 CAPSULE | Refills: 0 | Status: SHIPPED | OUTPATIENT
Start: 2019-08-21

## 2019-08-21 RX ORDER — METOCLOPRAMIDE HYDROCHLORIDE 5 MG/ML
10 INJECTION INTRAMUSCULAR; INTRAVENOUS ONCE
Status: COMPLETED | OUTPATIENT
Start: 2019-08-21 | End: 2019-08-21

## 2019-08-21 RX ORDER — CLINDAMYCIN HYDROCHLORIDE 150 MG/1
300 CAPSULE ORAL 3 TIMES DAILY
Qty: 42 CAPSULE | Refills: 0 | Status: SHIPPED | OUTPATIENT
Start: 2019-08-21 | End: 2019-08-28

## 2019-08-21 RX ORDER — DIPHENHYDRAMINE HCL 25 MG
25 CAPSULE ORAL EVERY 6 HOURS PRN
Qty: 20 CAPSULE | Refills: 0 | Status: SHIPPED | OUTPATIENT
Start: 2019-08-21

## 2019-08-21 RX ORDER — DIPHENHYDRAMINE HYDROCHLORIDE 50 MG/ML
50 INJECTION INTRAMUSCULAR; INTRAVENOUS ONCE
Status: COMPLETED | OUTPATIENT
Start: 2019-08-21 | End: 2019-08-21

## 2019-08-21 RX ADMIN — DIPHENHYDRAMINE HYDROCHLORIDE 50 MG: 50 INJECTION, SOLUTION INTRAMUSCULAR; INTRAVENOUS at 09:14

## 2019-08-21 RX ADMIN — METOCLOPRAMIDE 10 MG: 5 INJECTION, SOLUTION INTRAMUSCULAR; INTRAVENOUS at 09:14

## 2019-08-21 ASSESSMENT — PAIN DESCRIPTION - PAIN TYPE: TYPE: ACUTE PAIN

## 2019-08-21 ASSESSMENT — PAIN DESCRIPTION - FREQUENCY: FREQUENCY: CONTINUOUS

## 2019-08-21 ASSESSMENT — PAIN SCALES - GENERAL: PAINLEVEL_OUTOF10: 10

## 2019-08-21 ASSESSMENT — PAIN DESCRIPTION - DESCRIPTORS: DESCRIPTORS: SHOOTING;SHARP;STABBING

## 2019-08-21 ASSESSMENT — PAIN DESCRIPTION - LOCATION: LOCATION: HEAD;MOUTH

## 2019-08-21 NOTE — ED NOTES
Discharge instructions reviewed with patient. PT verbalizes understanding. All questions answered. Follow up instructions given. PT denies any further needs at this time.       Marianna Prader, [place]  22/39/72 [id number]

## 2019-08-21 NOTE — ED PROVIDER NOTES
EMERGENCY DEPARTMENT ENCOUNTER      PCP: NITIN Lamas NP    CHIEF COMPLAINT    Chief Complaint   Patient presents with    Dental Pain    Migraine         This patient was not evaluated by the attending physician. I have independently evaluated this patient . HPI    Dyan Tagn is a 49 y.o. male who arrives to the ED today via POV with gradual onset of a headache occurring at rest since onset two days. Pain is localized in the bilateral frontal region of the head. The duration has been since onset, with varying degrees of severity. The quality of the headache is achy. This headache is not the worst headache of the patient's life. The patient has no associated vomiting, neck pain or stiffness, loss of consciousness, altered mentation (confusion, memory loss, hallucinations, vision changes), weakness or sensory loss. Patient has a history of headaches, states migraine headaches,, greater than 3 headaches in the last 6 months, and denies any new symptoms or severity of symptoms different from previous headaches. Patient also has dental abscess in the right low molar region. He states there is swelling and pain localized to this area. He states he is scheduled to have dental procedure but needs to have antibiotics first.  Denies any difficulty swallowing or breathing. Denies fevers. He attributes his headache recently to the pain caused by his dental abscess. REVIEW OF SYSTEMS   Constitutional:  Denies fever, chills, weight loss or weakness   Neurologic:  See HPI. Denies lightheadedness, dizziness. Eyes:  See HPI. Denies discharge, diplopia, blurred vision, or loss visual field  HENT:  Denies sore throat or ear pain   GI  Denies abdominal pain. :  Denies Dysuria or Hematuria. Musculoskeletal:  Denies back pain.      Skin:  Denies rash   Endocrine:  Denies polyuria or polydypsia   Lymphatic:  Denies swollen glands   Psychiatric:   Denies depression, suicidal ideation or homicidal ideation     All other review of systems negative at this time  See HPI and nursing notes for additional information      PAST MEDICAL & SURGICAL HISTORY    Past Medical History:   Diagnosis Date    Back ache     Hypertension      History reviewed. No pertinent surgical history. CURRENT MEDICATIONS    Current Outpatient Rx   Medication Sig Dispense Refill    clindamycin (CLEOCIN) 150 MG capsule Take 2 capsules by mouth 3 times daily for 7 days 42 capsule 0    butalbital-APAP-caffeine (FIORICET) -40 MG CAPS per capsule Take 1 capsule by mouth every 6 hours as needed for Headaches 10 capsule 0    diphenhydrAMINE (BENADRYL) 25 MG capsule Take 1 capsule by mouth every 6 hours as needed (headache) 20 capsule 0    amLODIPine (NORVASC) 10 MG tablet Take 1 tablet by mouth daily 30 tablet 0    metoprolol tartrate (LOPRESSOR) 50 MG tablet Take 1 tablet by mouth daily 60 tablet 0    spironolactone (ALDACTONE) 25 MG tablet Take 0.5 tablets by mouth daily 45 tablet 1    hydrALAZINE (APRESOLINE) 25 MG tablet Take 1 tablet by mouth 3 times daily 90 tablet 0    atorvastatin (LIPITOR) 40 MG tablet Take 1 tablet by mouth daily 30 tablet 3    folic acid (FOLVITE) 1 MG tablet Take 1 mg by mouth daily      gabapentin (NEURONTIN) 300 MG capsule Take 300 mg by mouth 3 times daily.       topiramate (TOPAMAX) 25 MG tablet Take 1 tablet by mouth 2 times daily 60 tablet 0    acetaminophen (TYLENOL) 500 MG tablet Take 1 tablet by mouth every 6 hours as needed for Pain 60 tablet 1    Cholecalciferol (VITAMIN D3) 1000 units CAPS Take 1 capsule by mouth daily      SUMAtriptan (IMITREX) 100 MG tablet Take 100 mg by mouth once as needed for Migraine      aspirin EC 81 MG EC tablet Take 1 tablet by mouth daily 30 tablet 0    ammonium lactate (LAC-HYDRIN) 12 % lotion Apply topically 2 times daily  2       ALLERGIES    Allergies   Allergen Reactions    Pcn [Penicillins]      diarrhea       SOCIAL & FAMILY HISTORY Funduscopic exam -unable to visualize fundus due to poor cooperation, light sensitivity. Neck: supple, no JVD. Cardiovascular:  Reg rate & rhythm, no murmurs/rubs/gallops. Respiratory:  Lungs Clear, no retractions   GI:  Soft, nontender, normal bowel sounds  Musculoskeletal:  No edema, no deformities  Integument:  Well hydrated, no petechiae. No rash or vesicles on face, nose, auricle or ear canal.    Neurologic:    - Alert & oriented person, place, time, and situation, no speech difficulties or slurring.  - No obvious gross motor deficits  - Cranial nerves 2-12 grossly intact  - Negative meningeal signs (Kernig's and Brudzinski's both negative)  - Sensation intact to light touch  - Strength 5/5 in upper and lower extremities bilaterally  - Normal finger to nose test bilaterally  - Rapid alternating movements intact  - Normal heel-shin bilaterally  - No pronator drift. - Light touch sensation intact throughout. - Upper and lower extremity DTRs 2+ bilaterally. - Gait steady and without difficulty  -No truncal ataxia  -Negative skew test bilateral eyes      Psych: Pleasant affect, no hallucinations            ED COURSE & MEDICAL DECISION MAKING      I estimate there is LOW risk for BACTERIAL MENINGITIS, SUBARACHNOID HEMORRHAGE, ISCHEMIC OR HEMORRHAGE CVA, or ACUTE CORONARY SYNDROME thus I consider the discharge disposition reasonable. Edwin Neil (or their surrogate) and I have discussed the diagnosis and risks, and we agree with discharging home with close follow-up. This was a shared decision at bedside and patient elects to hold on imaging, laboratory evaluation, &  LP today. We also discussed returning to the Emergency Department immediately if new or worsening symptoms occur. We have discussed the symptoms which are most concerning that necessitate immediate return.    --------------------------------  As part of the possible diagnoses, I considered subarachnoid Hemorrhage [facility]).   However, there is no

## 2019-09-05 RX ORDER — METOPROLOL TARTRATE 50 MG/1
50 TABLET, FILM COATED ORAL 2 TIMES DAILY
Qty: 60 TABLET | Refills: 5 | Status: SHIPPED | OUTPATIENT
Start: 2019-09-05 | End: 2019-12-18 | Stop reason: SDUPTHER

## 2019-12-18 ENCOUNTER — OFFICE VISIT (OUTPATIENT)
Dept: CARDIOLOGY CLINIC | Age: 54
End: 2019-12-18
Payer: MEDICARE

## 2019-12-18 VITALS
DIASTOLIC BLOOD PRESSURE: 60 MMHG | HEART RATE: 60 BPM | HEIGHT: 67 IN | WEIGHT: 173 LBS | SYSTOLIC BLOOD PRESSURE: 102 MMHG | BODY MASS INDEX: 27.15 KG/M2

## 2019-12-18 DIAGNOSIS — I10 ESSENTIAL HYPERTENSION: ICD-10-CM

## 2019-12-18 DIAGNOSIS — I42.0 DILATED CARDIOMYOPATHY (HCC): Primary | ICD-10-CM

## 2019-12-18 PROCEDURE — 1036F TOBACCO NON-USER: CPT | Performed by: NURSE PRACTITIONER

## 2019-12-18 PROCEDURE — 99213 OFFICE O/P EST LOW 20 MIN: CPT | Performed by: NURSE PRACTITIONER

## 2019-12-18 PROCEDURE — 3017F COLORECTAL CA SCREEN DOC REV: CPT | Performed by: NURSE PRACTITIONER

## 2019-12-18 PROCEDURE — G8484 FLU IMMUNIZE NO ADMIN: HCPCS | Performed by: NURSE PRACTITIONER

## 2019-12-18 PROCEDURE — G8427 DOCREV CUR MEDS BY ELIG CLIN: HCPCS | Performed by: NURSE PRACTITIONER

## 2019-12-18 PROCEDURE — G8419 CALC BMI OUT NRM PARAM NOF/U: HCPCS | Performed by: NURSE PRACTITIONER

## 2019-12-18 RX ORDER — ASPIRIN 81 MG/1
81 TABLET ORAL DAILY
Qty: 30 TABLET | Refills: 5 | Status: SHIPPED | OUTPATIENT
Start: 2019-12-18

## 2019-12-18 RX ORDER — METOPROLOL TARTRATE 50 MG/1
50 TABLET, FILM COATED ORAL 2 TIMES DAILY
Qty: 60 TABLET | Refills: 5 | Status: SHIPPED | OUTPATIENT
Start: 2019-12-18

## 2019-12-18 RX ORDER — HYDRALAZINE HYDROCHLORIDE 25 MG/1
25 TABLET, FILM COATED ORAL 3 TIMES DAILY
Qty: 90 TABLET | Refills: 2 | Status: SHIPPED | OUTPATIENT
Start: 2019-12-18 | End: 2020-05-06

## 2019-12-18 RX ORDER — SPIRONOLACTONE 25 MG/1
12.5 TABLET ORAL DAILY
Qty: 45 TABLET | Refills: 1 | Status: SHIPPED | OUTPATIENT
Start: 2019-12-18

## 2019-12-18 RX ORDER — AMLODIPINE BESYLATE 10 MG/1
10 TABLET ORAL DAILY
Qty: 30 TABLET | Refills: 5 | Status: SHIPPED | OUTPATIENT
Start: 2019-12-18

## 2020-01-11 ENCOUNTER — HOSPITAL ENCOUNTER (EMERGENCY)
Age: 55
Discharge: HOME | End: 2020-01-11
Payer: MEDICARE

## 2020-01-11 VITALS
HEIGHT: 68 IN | WEIGHT: 170 LBS | SYSTOLIC BLOOD PRESSURE: 115 MMHG | BODY MASS INDEX: 25.76 KG/M2 | TEMPERATURE: 98.9 F | OXYGEN SATURATION: 99 % | DIASTOLIC BLOOD PRESSURE: 77 MMHG | RESPIRATION RATE: 16 BRPM | HEART RATE: 72 BPM

## 2020-01-11 PROCEDURE — 99283 EMERGENCY DEPT VISIT LOW MDM: CPT

## 2020-01-11 PROCEDURE — 6370000000 HC RX 637 (ALT 250 FOR IP): Performed by: PHYSICIAN ASSISTANT

## 2020-01-11 RX ORDER — ERYTHROMYCIN 5 MG/G
OINTMENT OPHTHALMIC
Qty: 1 TUBE | Refills: 0 | Status: SHIPPED | OUTPATIENT
Start: 2020-01-11

## 2020-01-11 RX ORDER — TETRACAINE HYDROCHLORIDE 5 MG/ML
2 SOLUTION OPHTHALMIC SEE ADMIN INSTRUCTIONS
Status: DISCONTINUED | OUTPATIENT
Start: 2020-01-11 | End: 2020-01-11 | Stop reason: HOSPADM

## 2020-01-11 RX ADMIN — FLUORESCEIN SODIUM 1 MG: 1 STRIP OPHTHALMIC at 13:22

## 2020-01-11 RX ADMIN — TETRACAINE HYDROCHLORIDE 2 DROP: 5 SOLUTION OPHTHALMIC at 13:22

## 2020-01-11 ASSESSMENT — PAIN DESCRIPTION - ORIENTATION: ORIENTATION: RIGHT

## 2020-01-11 ASSESSMENT — PAIN DESCRIPTION - LOCATION: LOCATION: EYE

## 2020-01-11 ASSESSMENT — PAIN DESCRIPTION - PAIN TYPE: TYPE: ACUTE PAIN

## 2020-01-11 ASSESSMENT — PAIN SCALES - GENERAL: PAINLEVEL_OUTOF10: 9

## 2020-01-11 NOTE — ED NOTES
Patient vision screen completed at this time. Patient left eye nonfunctional patient cannot normally see out of eye. Patient vision with R eye was 20/200.       Ankit Garcia RN  01/11/20 [id number]

## 2020-01-11 NOTE — ED PROVIDER NOTES
capsule Take 1 capsule by mouth every 6 hours as needed (headache) 20 capsule 0    atorvastatin (LIPITOR) 40 MG tablet Take 1 tablet by mouth daily 30 tablet 3    folic acid (FOLVITE) 1 MG tablet Take 1 mg by mouth daily      gabapentin (NEURONTIN) 300 MG capsule Take 300 mg by mouth 3 times daily.       topiramate (TOPAMAX) 25 MG tablet Take 1 tablet by mouth 2 times daily 60 tablet 0    acetaminophen (TYLENOL) 500 MG tablet Take 1 tablet by mouth every 6 hours as needed for Pain 60 tablet 1    Cholecalciferol (VITAMIN D3) 1000 units CAPS Take 1 capsule by mouth daily      SUMAtriptan (IMITREX) 100 MG tablet Take 100 mg by mouth once as needed for Migraine      ammonium lactate (LAC-HYDRIN) 12 % lotion Apply topically 2 times daily  2       ALLERGIES    Allergies   Allergen Reactions    Pcn [Penicillins]      diarrhea       SOCIAL and FAMILY HISTORY    Social History     Socioeconomic History    Marital status: Single     Spouse name: None    Number of children: None    Years of education: None    Highest education level: None   Occupational History    None   Social Needs    Financial resource strain: None    Food insecurity:     Worry: None     Inability: None    Transportation needs:     Medical: None     Non-medical: None   Tobacco Use    Smoking status: Former Smoker     Packs/day: 0.25     Types: Cigarettes    Smokeless tobacco: Never Used   Substance and Sexual Activity    Alcohol use: Never     Frequency: Never    Drug use: Never    Sexual activity: None   Lifestyle    Physical activity:     Days per week: None     Minutes per session: None    Stress: None   Relationships    Social connections:     Talks on phone: None     Gets together: None     Attends [employer] service: None     Active member of club or organization: None     Attends meetings of clubs or organizations: None     Relationship status: None    Intimate partner violence:     Fear of current or ex partner: None

## 2020-05-06 RX ORDER — HYDRALAZINE HYDROCHLORIDE 25 MG/1
25 TABLET, FILM COATED ORAL 3 TIMES DAILY
Qty: 90 TABLET | Refills: 5 | Status: SHIPPED | OUTPATIENT
Start: 2020-05-06

## 2020-11-03 PROBLEM — I10 ESSENTIAL HYPERTENSION: Status: RESOLVED | Noted: 2019-12-18 | Resolved: 2020-11-03

## 2023-01-17 ENCOUNTER — TELEPHONE (OUTPATIENT)
Dept: CARDIOLOGY CLINIC | Age: 58
End: 2023-01-17

## 2023-01-17 NOTE — TELEPHONE ENCOUNTER
Left message for patient requesting a return call to schedule a consult with previous [facility] patient for dilated cardiomyopathy per referral from [facility], [place].

## 2023-01-20 ENCOUNTER — TELEPHONE (OUTPATIENT)
Dept: CARDIOLOGY CLINIC | Age: 58
End: 2023-01-20

## 2023-01-20 NOTE — TELEPHONE ENCOUNTER
Left message for patient requesting a return call to schedule a consult with previous [facility] patient for dilated cardiomyopathy per referral from [facility].

## 2023-01-23 ENCOUNTER — TELEPHONE (OUTPATIENT)
Dept: CARDIOLOGY CLINIC | Age: 58
End: 2023-01-23